# Patient Record
Sex: MALE | Race: BLACK OR AFRICAN AMERICAN | NOT HISPANIC OR LATINO | Employment: FULL TIME | URBAN - METROPOLITAN AREA
[De-identification: names, ages, dates, MRNs, and addresses within clinical notes are randomized per-mention and may not be internally consistent; named-entity substitution may affect disease eponyms.]

---

## 2017-02-10 ENCOUNTER — HOSPITAL ENCOUNTER (OUTPATIENT)
Dept: RADIOLOGY | Facility: HOSPITAL | Age: 27
Discharge: HOME | End: 2017-02-10
Attending: FAMILY MEDICINE
Payer: COMMERCIAL

## 2017-02-10 ENCOUNTER — OFFICE VISIT (OUTPATIENT)
Dept: INTERNAL MEDICINE | Facility: CLINIC | Age: 27
End: 2017-02-10
Payer: COMMERCIAL

## 2017-02-10 VITALS
OXYGEN SATURATION: 98 % | BODY MASS INDEX: 39.17 KG/M2 | DIASTOLIC BLOOD PRESSURE: 90 MMHG | TEMPERATURE: 97 F | WEIGHT: 315 LBS | SYSTOLIC BLOOD PRESSURE: 140 MMHG | HEART RATE: 65 BPM | HEIGHT: 75 IN

## 2017-02-10 DIAGNOSIS — G89.29 CHRONIC PAIN OF RIGHT KNEE: ICD-10-CM

## 2017-02-10 DIAGNOSIS — M25.561 CHRONIC PAIN OF RIGHT KNEE: Primary | ICD-10-CM

## 2017-02-10 DIAGNOSIS — G89.29 CHRONIC PAIN OF RIGHT KNEE: Primary | ICD-10-CM

## 2017-02-10 DIAGNOSIS — M25.561 CHRONIC PAIN OF RIGHT KNEE: ICD-10-CM

## 2017-02-10 PROCEDURE — 99202 OFFICE O/P NEW SF 15 MIN: CPT | Mod: S$GLB,,, | Performed by: PHYSICIAN ASSISTANT

## 2017-02-10 PROCEDURE — 99999 PR PBB SHADOW E&M-EST. PATIENT-LVL IV: CPT | Mod: PBBFAC,,, | Performed by: PHYSICIAN ASSISTANT

## 2017-02-10 PROCEDURE — 73562 X-RAY EXAM OF KNEE 3: CPT | Mod: 26,RT,, | Performed by: RADIOLOGY

## 2017-02-10 PROCEDURE — 73560 X-RAY EXAM OF KNEE 1 OR 2: CPT | Mod: TC,59,LT

## 2017-02-10 PROCEDURE — 73560 X-RAY EXAM OF KNEE 1 OR 2: CPT | Mod: 26,59,LT, | Performed by: RADIOLOGY

## 2017-02-10 RX ORDER — MELOXICAM 15 MG/1
15 TABLET ORAL DAILY
Qty: 30 TABLET | Refills: 1 | Status: SHIPPED | OUTPATIENT
Start: 2017-02-10 | End: 2017-10-25 | Stop reason: SDUPTHER

## 2017-02-10 NOTE — PROGRESS NOTES
Subjective:       Patient ID: Lorne Hector is a 26 y.o. male.    Chief Complaint: Knee Pain (right)    Knee Pain    Incident onset: several months. There was no injury mechanism. The pain is present in the right knee. The quality of the pain is described as burning. The pain is moderate. The pain has been fluctuating since onset. Associated symptoms include a loss of motion. Pertinent negatives include no inability to bear weight, loss of sensation, muscle weakness, numbness or tingling. Associated symptoms comments: Has a history of a meniscus tear. No surgery has been done at this point. . The symptoms are aggravated by movement. He has tried NSAIDs for the symptoms. The treatment provided mild relief.     Review of Systems   Constitutional: Negative for chills, fatigue and fever.   Musculoskeletal: Positive for joint swelling. Negative for gait problem.   Neurological: Negative for tingling and numbness.       Objective:      Physical Exam   Constitutional: He appears well-developed and well-nourished. No distress.   Musculoskeletal:        Right knee: He exhibits abnormal meniscus. He exhibits normal range of motion, no swelling, no effusion, no erythema and normal patellar mobility. Tenderness found. Medial joint line and lateral joint line tenderness noted.   Skin: He is not diaphoretic.   Nursing note and vitals reviewed.      Assessment:       1. Chronic pain of right knee        Plan:       Chronic pain of right knee  -     Cancel: X-Ray Knee 3 View Right; Future; Expected date: 2/10/17  -     Ambulatory referral to Orthopedics  -     X-ray Knee Ortho Right; Future; Expected date: 2/10/17    Other orders  -     meloxicam (MOBIC) 15 MG tablet; Take 1 tablet (15 mg total) by mouth once daily.  Dispense: 30 tablet; Refill: 1

## 2017-02-10 NOTE — PATIENT INSTRUCTIONS
Continue with your medicine until gone. Rest and use ice as needed. Suggest to go to the ER if you become much worse.

## 2017-09-12 ENCOUNTER — OFFICE VISIT (OUTPATIENT)
Dept: INTERNAL MEDICINE | Facility: CLINIC | Age: 27
End: 2017-09-12
Payer: COMMERCIAL

## 2017-09-12 ENCOUNTER — LAB VISIT (OUTPATIENT)
Dept: LAB | Facility: HOSPITAL | Age: 27
End: 2017-09-12
Attending: INTERNAL MEDICINE
Payer: COMMERCIAL

## 2017-09-12 VITALS
SYSTOLIC BLOOD PRESSURE: 130 MMHG | BODY MASS INDEX: 39.17 KG/M2 | TEMPERATURE: 98 F | DIASTOLIC BLOOD PRESSURE: 86 MMHG | WEIGHT: 315 LBS | HEART RATE: 77 BPM | OXYGEN SATURATION: 97 % | HEIGHT: 75 IN

## 2017-09-12 DIAGNOSIS — E55.9 VITAMIN D DEFICIENCY: Primary | ICD-10-CM

## 2017-09-12 DIAGNOSIS — E55.9 VITAMIN D DEFICIENCY: ICD-10-CM

## 2017-09-12 LAB — 25(OH)D3+25(OH)D2 SERPL-MCNC: 45 NG/ML

## 2017-09-12 PROCEDURE — 99213 OFFICE O/P EST LOW 20 MIN: CPT | Mod: S$GLB,,, | Performed by: PHYSICIAN ASSISTANT

## 2017-09-12 PROCEDURE — 3008F BODY MASS INDEX DOCD: CPT | Mod: S$GLB,,, | Performed by: PHYSICIAN ASSISTANT

## 2017-09-12 PROCEDURE — 99999 PR PBB SHADOW E&M-EST. PATIENT-LVL III: CPT | Mod: PBBFAC,,, | Performed by: PHYSICIAN ASSISTANT

## 2017-09-12 PROCEDURE — 82306 VITAMIN D 25 HYDROXY: CPT

## 2017-09-12 PROCEDURE — 36415 COLL VENOUS BLD VENIPUNCTURE: CPT

## 2017-09-12 RX ORDER — PREDNISONE 10 MG/1
TABLET ORAL
Qty: 18 TABLET | Refills: 0 | Status: SHIPPED | OUTPATIENT
Start: 2017-09-12 | End: 2017-10-25

## 2017-09-12 RX ORDER — DESLORATADINE 5 MG/1
5 TABLET ORAL DAILY
Qty: 30 TABLET | Refills: 11 | Status: SHIPPED | OUTPATIENT
Start: 2017-09-12 | End: 2017-10-25

## 2017-09-13 NOTE — PROGRESS NOTES
Subjective:       Patient ID: Lorne Hector is a 27 y.o. male.    Chief Complaint: Allergies    URI    This is a new problem. The current episode started 1 to 4 weeks ago. The problem has been unchanged. There has been no fever. Associated symptoms include congestion, a plugged ear sensation, rhinorrhea and sneezing. Pertinent negatives include no abdominal pain, chest pain, sinus pain, sore throat or wheezing. He has tried nothing for the symptoms.     Review of Systems   Constitutional: Negative for chills and fatigue.   HENT: Positive for congestion, postnasal drip, rhinorrhea and sneezing. Negative for sinus pain and sore throat.    Respiratory: Negative for shortness of breath and wheezing.    Cardiovascular: Negative for chest pain.   Gastrointestinal: Negative for abdominal pain.       Objective:      Physical Exam   Constitutional: He appears well-developed and well-nourished. No distress.   HENT:   Head: Normocephalic and atraumatic.   Right Ear: A middle ear effusion is present.   Left Ear: A middle ear effusion is present.   Nose: Mucosal edema and rhinorrhea present.   Mouth/Throat: Uvula is midline, oropharynx is clear and moist and mucous membranes are normal. No tonsillar exudate.   Skin: He is not diaphoretic.   Nursing note and vitals reviewed.      Assessment:       1. Vitamin D deficiency        Plan:       Vitamin D deficiency  -     Vitamin D; Future; Expected date: 09/12/2017    Other orders  -     predniSONE (DELTASONE) 10 MG tablet; Take 3 daily for 3 days, then 2 daily for three days, then 1 daily for three days.  Dispense: 18 tablet; Refill: 0  -     desloratadine (CLARINEX) 5 mg tablet; Take 1 tablet (5 mg total) by mouth once daily.  Dispense: 30 tablet; Refill: 11

## 2017-10-25 ENCOUNTER — OFFICE VISIT (OUTPATIENT)
Dept: INTERNAL MEDICINE | Facility: CLINIC | Age: 27
End: 2017-10-25
Payer: COMMERCIAL

## 2017-10-25 VITALS
BODY MASS INDEX: 39.17 KG/M2 | HEIGHT: 75 IN | TEMPERATURE: 97 F | HEART RATE: 89 BPM | SYSTOLIC BLOOD PRESSURE: 120 MMHG | WEIGHT: 315 LBS | DIASTOLIC BLOOD PRESSURE: 90 MMHG | OXYGEN SATURATION: 96 %

## 2017-10-25 DIAGNOSIS — M25.561 RIGHT KNEE PAIN, UNSPECIFIED CHRONICITY: Primary | ICD-10-CM

## 2017-10-25 PROCEDURE — 99213 OFFICE O/P EST LOW 20 MIN: CPT | Mod: S$GLB,,, | Performed by: FAMILY MEDICINE

## 2017-10-25 PROCEDURE — 99999 PR PBB SHADOW E&M-EST. PATIENT-LVL IV: CPT | Mod: PBBFAC,,, | Performed by: FAMILY MEDICINE

## 2017-10-25 RX ORDER — MELOXICAM 15 MG/1
15 TABLET ORAL DAILY
Qty: 30 TABLET | Refills: 1 | Status: SHIPPED | OUTPATIENT
Start: 2017-10-25 | End: 2018-03-13 | Stop reason: ALTCHOICE

## 2017-10-25 NOTE — PROGRESS NOTES
Lorne Nabil Krunal  10/25/2017  [id number]    Primary Doctor No  Patient Care Team:  Primary Doctor No as PCP - General  Has the patient seen any provider outside of the [facility] network since the last visit? (no). If yes, HIPPA forms completed and records requested.        Visit Type:an urgent visit for an existing problem     Chief Complaint:  Chief Complaint   Patient presents with    Knee Pain     Right,Tight,Started Yesterday,Took Ibuprofen       History of Present Illness:  27 year old here for knee pain.  He has seen PA for this in Feb.  He was given Mobic and referred to Ortho.  I do not see that referral was completed in chart.    He had xray of knee.2.10.17    Findings: There is mild joint space narrowing involving the medial compartment of the right knee.  There is also mild degenerative change involving the patellofemoral compartment.  No joint effusion.  No acute fracture or dislocation.    He has tried Ibuprofen for his pain and swelling. Mobic helped a little as well.    He was a football player in highschool. Gives history of meniscal injury without repair.      He had history of Vit D def. The last provider ordered Vit D and it was normal range.      History:  History reviewed. No pertinent past medical history.  History reviewed. No pertinent surgical history.  Family History   Problem Relation Age of Onset    Asthma Mother     Heart disease Mother     Hypertension Mother     Hypertension Father     Asthma Sister     Diabetes Son     Diabetes Paternal Aunt      Social History     Social History    Marital status: Single     Spouse name: N/A    Number of children: N/A    Years of education: N/A     Occupational History     [employer]     Social History Main Topics    Smoking status: Never Smoker    Smokeless tobacco: Never Used    Alcohol use Yes      Comment: Occasionally    Drug use: No    Sexual activity: No     Other Topics Concern    Not on file     Social History Narrative     No narrative on file     There is no problem list on file for this patient.    Review of patient's allergies indicates:  No Known Allergies    The following were reviewed at this visit: active problem list, medication list, allergies, family history, social history, and health maintenance.    Medications:  Current Outpatient Prescriptions on File Prior to Visit   Medication Sig Dispense Refill    [DISCONTINUED] desloratadine (CLARINEX) 5 mg tablet Take 1 tablet (5 mg total) by mouth once daily. 30 tablet 11    MULTIVITAMIN (DAILY VITAMINS ORAL) Take by mouth.      [DISCONTINUED] meloxicam (MOBIC) 15 MG tablet Take 1 tablet (15 mg total) by mouth once daily. 30 tablet 1    [DISCONTINUED] predniSONE (DELTASONE) 10 MG tablet Take 3 daily for 3 days, then 2 daily for three days, then 1 daily for three days. 18 tablet 0     No current facility-administered medications on file prior to visit.        Medications have been reviewed and reconciled with patient at this visit.  Barriers to medications present (no)    Adverse reactions to current medications (no)    Over the counter medications reviewed (Yes ), and if needed added to active Medication list at this visit.     Exam:  Wt Readings from Last 3 Encounters:   10/25/17 (!) 177.3 kg (390 lb 14 oz)   09/12/17 (!) 174.6 kg (384 lb 14.8 oz)   02/10/17 (!) 167.2 kg (368 lb 9.8 oz)     Temp Readings from Last 3 Encounters:   10/25/17 97.4 °F (36.3 °C) (Tympanic)   09/12/17 97.9 °F (36.6 °C) (Tympanic)   02/10/17 96.8 °F (36 °C) (Tympanic)     BP Readings from Last 3 Encounters:   10/25/17 (!) 120/90   09/12/17 130/86   02/10/17 (!) 140/90     Pulse Readings from Last 3 Encounters:   10/25/17 89   09/12/17 77   02/10/17 65     Body mass index is 48.86 kg/m².      Review of Systems   Constitutional: Negative.  Negative for chills and fever.   HENT: Negative.  Negative for congestion, sinus pain and sore throat.    Eyes: Negative for blurred vision and double vision.    Respiratory: Negative for cough, sputum production, shortness of breath and wheezing.    Cardiovascular: Negative for chest pain, palpitations and leg swelling.   Gastrointestinal: Negative for abdominal pain, constipation, diarrhea, heartburn, nausea and vomiting.   Genitourinary: Negative.    Musculoskeletal: Positive for joint pain.   Skin: Negative.  Negative for rash.   Neurological: Negative.    Endo/Heme/Allergies: Negative.  Negative for polydipsia. Does not bruise/bleed easily.   Psychiatric/Behavioral: Negative for depression and substance abuse.       Physical Exam   Musculoskeletal: Normal range of motion. He exhibits tenderness. He exhibits no edema.        Right knee: Tenderness found.       Laboratory Reviewed ({Yes)  Lab Results   Component Value Date    WBC 6.35 01/27/2012    HGB 15.1 01/27/2012    HCT 44.1 01/27/2012     01/27/2012    CHOL 166 01/27/2012    TRIG 98 01/27/2012    HDL 34 (L) 01/27/2012    ALT 40 01/27/2012    AST 40 01/27/2012     01/27/2012    K 4.3 01/27/2012     01/27/2012    CREATININE 1.0 01/27/2012    BUN 12 01/27/2012    CO2 26 01/27/2012    TSH 1.979 01/27/2012       Assessment:  The encounter diagnosis was Right knee pain, unspecified chronicity.     Plan     Right knee pain, unspecified chronicity  -     Ambulatory referral to Orthopedics  -     meloxicam (MOBIC) 15 MG tablet; Take 1 tablet (15 mg total) by mouth once daily.  Dispense: 30 tablet; Refill: 1    Refer again to Ortho.  He did not show up for appt he had in Feb 2017 with Ortho    Discussed overuse of NSAID.  May consider PT  Will discuss with Ortho    He declines the flu vaccine today    Monitor BP, mild elevated. Lifestyle changes  Weight loss and diet discussed  Recheck one year    Care Plan/Goals: Reviewed  (N/A)  Goals     None          Follow up: No Follow-up on file.    After visit summary was printed and given to patient upon discharge today.  Patient goals and care plan are included  in After Visit Summary.

## 2017-10-26 ENCOUNTER — OFFICE VISIT (OUTPATIENT)
Dept: ORTHOPEDICS | Facility: CLINIC | Age: 27
End: 2017-10-26
Payer: COMMERCIAL

## 2017-10-26 VITALS
SYSTOLIC BLOOD PRESSURE: 166 MMHG | DIASTOLIC BLOOD PRESSURE: 94 MMHG | WEIGHT: 315 LBS | HEIGHT: 75 IN | RESPIRATION RATE: 12 BRPM | BODY MASS INDEX: 39.17 KG/M2 | HEART RATE: 95 BPM

## 2017-10-26 DIAGNOSIS — M25.561 ACUTE PAIN OF RIGHT KNEE: ICD-10-CM

## 2017-10-26 DIAGNOSIS — S83.421A SPRAIN OF LATERAL COLLATERAL LIGAMENT OF RIGHT KNEE, INITIAL ENCOUNTER: Primary | ICD-10-CM

## 2017-10-26 PROCEDURE — 99213 OFFICE O/P EST LOW 20 MIN: CPT | Mod: S$GLB,,, | Performed by: PHYSICIAN ASSISTANT

## 2017-10-26 PROCEDURE — 99999 PR PBB SHADOW E&M-EST. PATIENT-LVL III: CPT | Mod: PBBFAC,,, | Performed by: PHYSICIAN ASSISTANT

## 2017-10-26 NOTE — LETTER
October 27, 2017      Mirna Reilly MD  [address]  [place] [address]           [facility]  [address]  [place] [address]  Phone: [phone]  Fax: [phone]          Patient: Lorne Hector   MR Number: [id number]   YOB: 1990   Date of Visit: 10/26/2017       Dear Dr. Mirna Reilly:    Thank you for referring Lorne Hector to me for evaluation. Attached you will find relevant portions of my assessment and plan of care.    If you have questions, please do not hesitate to call me. I look forward to following Lorne Hector along with you.    Sincerely,    Autumn Colmenares PA-C    Enclosure  CC:  No Recipients    If you would like to receive this communication electronically, please contact [email or web address] or [phone] to request more information on [employer] access.    For providers and/or their staff who would like to refer a patient to [facility], please contact us through our one-stop-shop provider referral line, [facility] , at [phone].    If you feel you have received this communication in error or would no longer like to receive these types of communications, please e-mail [email or web address]

## 2017-10-26 NOTE — PROGRESS NOTES
Subjective:      Patient ID: Lorne Hector is a 27 y.o. male.    Chief Complaint: Pain of the Right Knee      HPI: Lorne Hector  is a 27 y.o. male who c/o Pain of the Right Knee   for duration of about 3 days.  He was moving some cabinets out of a truck at work when he felt a pop in the knee and had onset of pain.  He complains of associated swelling following that, however, the swelling has improved.  He now complains of a tightness as well as a sharp intermittent pain in the right knee posterior laterally.  He also has some aching and throbbing pain as well.  Alleviating factors include rest.  Aggravating factors include pivoting, deep flexion, and stairs.  He tried and knee brace that he had in his possession which did not help his pain.  Pain level at worst is 0 out of 10.  However, it increases with the above activities.    Review of Systems   Constitution: Negative for fever.   Cardiovascular: Negative for chest pain.   Respiratory: Negative for cough and shortness of breath.    Skin: Negative for rash.   Musculoskeletal: Positive for joint pain, joint swelling and stiffness.   Gastrointestinal: Negative for heartburn.   Neurological: Negative for headaches and numbness.         Objective:        General    Nursing note and vitals reviewed.  Constitutional: He is oriented to person, place, and time. He appears well-developed and well-nourished.   HENT:   Head: Normocephalic and atraumatic.   Eyes: EOM are normal.   Cardiovascular: Normal rate and regular rhythm.    Pulmonary/Chest: Effort normal.   Abdominal: Soft.   Neurological: He is alert and oriented to person, place, and time.   Psychiatric: He has a normal mood and affect. His behavior is normal.     General Musculoskeletal Exam   Gait: normal       Right Knee Exam     Inspection   Erythema: absent  Swelling: absent  Effusion: effusion  Deformity: deformity  Bruising: absent    Tenderness   The patient is tender to palpation of the  LCL.    Crepitus   The patient has crepitus of the patella.    Range of Motion   Extension: normal   Flexion: abnormal     Tests   Meniscus   Artem:  Medial - negative Lateral - negative  Ligament Examination Lachman: normal (-1 to 2mm) PCL-Posterior Drawer: normal (0 to 2mm)     MCL - Valgus: normal (0 to 2mm)  LCL - Varus: abnormal - grade I  Dial Test at 30 degrees: abnormal - grade IDial Test at 90 degrees: normal (< 5 degrees)  Patella   Patellar Apprehension: negative  Patellar Tracking: normal  Patellar Grind: negative    Other   Meniscal Cyst: absent  Popliteal (Baker's) Cyst: absent  Sensation: normal    Comments:  Comp soft, cap refill < 2 sec.    Left Knee Exam     Range of Motion   Extension: normal   Flexion: normal     Tests   Stability Lachman: normal (-1 to 2mm) PCL-Posterior Drawer: normal (0 to 2mm)  MCL - Valgus: normal (0 to 2mm)  LCL - Varus: normal (0 to 2mm)Dial Test at 30 degrees: normal (< 5 degrees)Dial Test at 90 degrees: normal (< 5 degrees)    Muscle Strength   Right Lower Extremity   Quadriceps:  5/5   Hamstrin/5     Vascular Exam       Edema  Right Lower Leg: absent            Xray:   Right knee from 2/10/17 images and report were reviewed today.  I agree with the radiologist's interpretation.  There is mild joint space narrowing involving the medial compartment of the right knee.  There is also mild degenerative change involving the patellofemoral compartment.  No joint effusion.  No acute fracture or dislocation.    Assessment:       Encounter Diagnoses   Name Primary?    Sprain of lateral collateral ligament of right knee, initial encounter Yes    Acute pain of right knee           Plan:       Lorne was seen today for pain.    Diagnoses and all orders for this visit:    Sprain of lateral collateral ligament of right knee, initial encounter  -     MRI Lower Extremity Joint WO Cont Right; Future    Acute pain of right knee    Lorne comes in today for new injury of the  right knee.  I do have a little concerned that he sprained his lateral collateral ligament.  I have ordered an MRI to evaluate his posterior lateral corner as well as his lateral meniscus.  I will call him with those results once they become available.  Patient verbalizes understanding and agrees with the above plan.        The patient understands, chooses and consents to this plan and accepts all   the risks which include but are not limited to the risks mentioned above.     Disclaimer: This note was prepared using a voice recognition system and is likely to have sound alike errors within the text.

## 2017-10-30 ENCOUNTER — HOSPITAL ENCOUNTER (OUTPATIENT)
Dept: RADIOLOGY | Facility: HOSPITAL | Age: 27
Discharge: HOME | End: 2017-10-30
Attending: PHYSICIAN ASSISTANT
Payer: COMMERCIAL

## 2017-10-30 ENCOUNTER — TELEPHONE (OUTPATIENT)
Dept: ORTHOPEDICS | Facility: CLINIC | Age: 27
End: 2017-10-30

## 2017-10-30 DIAGNOSIS — S83.421A SPRAIN OF LATERAL COLLATERAL LIGAMENT OF RIGHT KNEE, INITIAL ENCOUNTER: ICD-10-CM

## 2017-10-30 PROCEDURE — 73721 MRI JNT OF LWR EXTRE W/O DYE: CPT | Mod: TC,PO,RT

## 2017-10-30 PROCEDURE — 73721 MRI JNT OF LWR EXTRE W/O DYE: CPT | Mod: 26,RT,, | Performed by: RADIOLOGY

## 2017-10-30 NOTE — TELEPHONE ENCOUNTER
----- Message from Corbin Kaufman sent at 10/30/2017  9:47 AM CDT -----  Contact: [phone]  Would like to consult with nurse about test results. Please call bk at [phone]. thx lj

## 2017-10-30 NOTE — TELEPHONE ENCOUNTER
Returned call to patient and informed him that MARK ANTHONY Colmenares would give him a call once she receives his results. Patient verbalized understanding.

## 2017-10-30 NOTE — TELEPHONE ENCOUNTER
----- Message from Edwige Escudero sent at 10/30/2017 12:04 PM CDT -----  Pt is requesting a call from nurse to schedule for his results.            Please call pt back at [phone]

## 2017-11-01 ENCOUNTER — TELEPHONE (OUTPATIENT)
Dept: ORTHOPEDICS | Facility: CLINIC | Age: 27
End: 2017-11-01

## 2017-11-01 NOTE — TELEPHONE ENCOUNTER
Spoke with patient and informed him that he needs to see Dr Hodges. Patient states that this is Work Comp. Patient was given the information to have his adjustor contact our work comp department with his claim information and we will schedule his appointment once this is completed. Patient verbalized understanding.

## 2017-11-01 NOTE — TELEPHONE ENCOUNTER
Phoned patient with MRI results per MARK ANTHONY Colmenares and left a message for him to call back

## 2017-11-01 NOTE — TELEPHONE ENCOUNTER
----- Message from Cecile Keita sent at 11/1/2017 12:04 PM CDT -----  Contact: pt   Pt returning your phone call..[phone] ([phone])

## 2017-11-08 ENCOUNTER — PATIENT MESSAGE (OUTPATIENT)
Dept: ORTHOPEDICS | Facility: CLINIC | Age: 27
End: 2017-11-08

## 2017-11-10 ENCOUNTER — OFFICE VISIT (OUTPATIENT)
Dept: ORTHOPEDICS | Facility: CLINIC | Age: 27
End: 2017-11-10
Payer: OTHER MISCELLANEOUS

## 2017-11-10 VITALS
BODY MASS INDEX: 39.17 KG/M2 | RESPIRATION RATE: 18 BRPM | SYSTOLIC BLOOD PRESSURE: 150 MMHG | HEART RATE: 76 BPM | HEIGHT: 75 IN | DIASTOLIC BLOOD PRESSURE: 87 MMHG | WEIGHT: 315 LBS

## 2017-11-10 DIAGNOSIS — M76.31 ILIOTIBIAL BAND SYNDROME OF RIGHT SIDE: ICD-10-CM

## 2017-11-10 DIAGNOSIS — M23.006 CYST OF MENISCUS OF RIGHT KNEE: Primary | ICD-10-CM

## 2017-11-10 DIAGNOSIS — S83.241S ACUTE MEDIAL MENISCUS TEAR OF RIGHT KNEE, SEQUELA: Primary | ICD-10-CM

## 2017-11-10 DIAGNOSIS — S83.241A ACUTE MEDIAL MENISCUS TEAR OF RIGHT KNEE, INITIAL ENCOUNTER: ICD-10-CM

## 2017-11-10 PROCEDURE — 99999 PR PBB SHADOW E&M-EST. PATIENT-LVL III: CPT | Mod: PBBFAC,,, | Performed by: ORTHOPAEDIC SURGERY

## 2017-11-10 PROCEDURE — 99214 OFFICE O/P EST MOD 30 MIN: CPT | Mod: S$GLB,,, | Performed by: ORTHOPAEDIC SURGERY

## 2017-11-10 RX ORDER — DICLOFENAC SODIUM 10 MG/G
2 GEL TOPICAL 4 TIMES DAILY
Qty: 1 TUBE | Refills: 2 | Status: SHIPPED | OUTPATIENT
Start: 2017-11-10 | End: 2018-03-13 | Stop reason: SDUPTHER

## 2017-11-10 NOTE — PROGRESS NOTES
Subjective:     Patient ID: Lorne Hector is a 27 y.o. male.    Chief Complaint: Pain of the Right Knee    DOI 10/24/17  Works at [employer]  Moving cabinets around twisted the knee, felt a pop and had some pain after. Pain has improved with nsaids, no locking catching now.      Knee Injury    The pain is present in the right knee. This is a recurrent problem. The current episode started 1 to 4 weeks ago. There has been no history of extremity trauma. The injury was the result of a throwing action while at work. The problem occurs intermittently. The problem has been gradually improving. The quality of the pain is described as throbbing. The pain is at a severity of 3/10. Associated symptoms include joint swelling and numbness. Pertinent negatives include no fever. The symptoms are aggravated by bearing weight. He has tried cold for the symptoms. The treatment provided no relief.       History reviewed. No pertinent past medical history.  Past Surgical History:   Procedure Laterality Date    WISDOM TOOTH EXTRACTION       Family History   Problem Relation Age of Onset    Asthma Mother     Heart disease Mother     Hypertension Mother     Hypertension Father     Asthma Sister     Diabetes Son     Diabetes Paternal Aunt      Social History     Social History    Marital status: Single     Spouse name: N/A    Number of children: N/A    Years of education: N/A     Occupational History     [employer]     Social History Main Topics    Smoking status: Never Smoker    Smokeless tobacco: Never Used    Alcohol use Yes      Comment: Occasionally    Drug use: No    Sexual activity: No     Other Topics Concern    Not on file     Social History Narrative    No narrative on file     Medication List with Changes/Refills   Current Medications    MELOXICAM (MOBIC) 15 MG TABLET    Take 1 tablet (15 mg total) by mouth once daily.    MULTIVITAMIN (DAILY VITAMINS ORAL)    Take by mouth.     Review of  patient's allergies indicates:  No Known Allergies  Review of Systems   Constitution: Negative for fever and night sweats.   HENT: Negative for hearing loss.    Eyes: Negative for blurred vision and visual disturbance.   Cardiovascular: Positive for leg swelling. Negative for chest pain.   Respiratory: Negative for shortness of breath.    Endocrine: Negative for polyuria.   Hematologic/Lymphatic: Negative for bleeding problem.   Skin: Negative for rash.   Musculoskeletal: Positive for joint pain. Negative for back pain, joint swelling, muscle cramps and muscle weakness.   Gastrointestinal: Negative for melena.   Genitourinary: Negative for hematuria.   Neurological: Positive for loss of balance and numbness. Negative for paresthesias.   Psychiatric/Behavioral: Negative for altered mental status.       Objective:   Body mass index is 48.83 kg/m².  Vitals:    11/10/17 0840   BP: (!) 150/87   Pulse: 76   Resp: 18       General: Lorne is well-developed, well-nourished, appears stated age, in no acute distress, alert and oriented to time, place and person.       General    Vitals reviewed.  Constitutional: He is oriented to person, place, and time. He appears well-developed and well-nourished. No distress.   HENT:   Mouth/Throat: No oropharyngeal exudate.   Eyes: Right eye exhibits no discharge. Left eye exhibits no discharge.   Neck: Normal range of motion.   Pulmonary/Chest: Effort normal and breath sounds normal. No respiratory distress.   Neurological: He is alert and oriented to person, place, and time. He has normal reflexes. No cranial nerve deficit. Coordination normal.   Psychiatric: He has a normal mood and affect. His behavior is normal. Judgment and thought content normal.     General Musculoskeletal Exam   Gait: normal       Right Knee Exam     Inspection   Erythema: absent  Scars: absent  Swelling: absent  Effusion: effusion  Deformity: deformity  Bruising: absent    Tenderness   The patient is tender to  palpation of the iliotibial band.    Range of Motion   Extension: 0   Flexion: 140     Tests   Meniscus   Artem:  Medial - negative Lateral - negative  Ligament Examination Lachman: normal (-1 to 2mm) PCL-Posterior Drawer: normal (0 to 2mm)     MCL - Valgus: normal (0 to 2mm)  LCL - Varus: normalPivot Shift: normal (Equal)Reverse Pivot Shift: normal (Equal)Dial Test at 30 degrees: normal (< 5 degrees)Dial Test at 90 degrees: normal (< 5 degrees)  Posterior Sag Test: negative  Posterolateral Corner: unstable (>15 degrees difference)  Patella   Patellar Apprehension: negative  Passive Patellar Tilt: neutral  Patellar Tracking: normal  Patellar Glide (quadrants): Lateral - 1   Medial - 2  Q-Angle at 90 degrees: normal  Patellar Grind: negative  J-Sign: none    Other   Meniscal Cyst: absent  Popliteal (Baker's) Cyst: absent  Sensation: normal    Left Knee Exam     Inspection   Erythema: absent  Scars: absent  Swelling: absent  Effusion: absent  Deformity: deformity  Bruising: absent    Tenderness   The patient is experiencing no tenderness.         Range of Motion   Extension: 0   Flexion: 140     Tests   Meniscus   Artem:  Medial - negative Lateral - negative  Stability Lachman: normal (-1 to 2mm) PCL-Posterior Drawer: normal (0 to 2mm)  MCL - Valgus: normal (0 to 2mm)  LCL - Varus: normal (0 to 2mm)Pivot Shift: normal (Equal)Reverse Pivot Shift: normal (Equal)Dial Test at 30 degrees: normal (< 5 degrees)Dial Test at 90 degrees: normal (< 5 degrees)  Posterior Sag Test: negative  Posterolateral Corner: unstable (>15 degrees difference)  Patella   Patellar Apprehension: negative  Passive Patellar Tilt: neutral  Patellar Tracking: normal  Patellar Glide (Quadrants): Lateral - 1 Medial - 2  Q-Angle at 90 degrees: normal  Patellar Grind: negative  J-Sign: J sign absent    Other   Meniscal Cyst: absent  Popliteal (Baker's) Cyst: absent  Sensation: normal    Right Hip Exam     Tests   Juan F: positive  Left Hip Exam      Tests   Juan F: negative          Muscle Strength   Right Lower Extremity   Hip Abduction: 5/5   Quadriceps:  4/5   Hamstrin/5   Left Lower Extremity   Hip Abduction: 5/5   Quadriceps:  5/5   Hamstrin/5     Reflexes     Left Side  Quadriceps:  2+  Achilles:  2+    Right Side   Quadriceps:  2+  Achilles:  2+    Vascular Exam     Right Pulses  Dorsalis Pedis:      2+  Posterior Tibial:      2+        Left Pulses  Dorsalis Pedis:      2+  Posterior Tibial:      2+            Assessment:     Encounter Diagnoses   Name Primary?    Acute medial meniscus tear of right knee, initial encounter     Cyst of meniscus of right knee Yes    Iliotibial band syndrome of right side         Plan:     1. Discussed diagnosis with patient. He is not having mechanical symptoms at this time and no joint line tenderness over the meniscus. Discussed surgical vs nonsurgical management at this time, he would like to proceed with nonsurgical management. Discussed that the meniscal cyst may increase in size, he expressed understanding.    2. PT for IT band stretching/rolling/dry needling    3. Voltaren gel    4. F/up PRN    5. Continue work, no restrictions

## 2017-11-10 NOTE — LETTER
November 10, 2017      Autumn Colmenares PA-C  [address] [facility] [address] [place] [address]           [facility]  [address] [facility] [address] [place] [address]  Phone: [phone]  Fax: [phone]          Patient: Lorne Hector   MR Number: [id number]   YOB: 1990   Date of Visit: 11/10/2017       Dear Autumn Colmenares:    Thank you for referring Lorne Hector to me for evaluation. Attached you will find relevant portions of my assessment and plan of care.    If you have questions, please do not hesitate to call me. I look forward to following Lorne Hector along with you.    Sincerely,    Siddhartha Hodges MD    Enclosure  CC:  No Recipients    If you would like to receive this communication electronically, please contact [email or web address] or [phone] to request more information on [employer] Link access.    For providers and/or their staff who would like to refer a patient to [facility], please contact us through our one-stop-shop provider referral line, [facility] , at [phone].    If you feel you have received this communication in error or would no longer like to receive these types of communications, please e-mail [email or web address]

## 2017-11-14 ENCOUNTER — TELEPHONE (OUTPATIENT)
Dept: ORTHOPEDICS | Facility: CLINIC | Age: 27
End: 2017-11-14

## 2017-11-14 NOTE — TELEPHONE ENCOUNTER
----- Message from Allison Caballero sent at 11/14/2017  8:34 AM CST -----  .  A current work status summary is needed on this patient. Please fax to [phone]      Thanks

## 2018-02-23 ENCOUNTER — OFFICE VISIT (OUTPATIENT)
Dept: INTERNAL MEDICINE | Facility: CLINIC | Age: 28
End: 2018-02-23
Payer: COMMERCIAL

## 2018-02-23 ENCOUNTER — HOSPITAL ENCOUNTER (OUTPATIENT)
Dept: RADIOLOGY | Facility: HOSPITAL | Age: 28
Discharge: HOME | End: 2018-02-23
Attending: NURSE PRACTITIONER
Payer: COMMERCIAL

## 2018-02-23 VITALS
TEMPERATURE: 99 F | SYSTOLIC BLOOD PRESSURE: 136 MMHG | WEIGHT: 315 LBS | HEIGHT: 75 IN | BODY MASS INDEX: 39.17 KG/M2 | DIASTOLIC BLOOD PRESSURE: 84 MMHG | HEART RATE: 78 BPM | OXYGEN SATURATION: 97 %

## 2018-02-23 DIAGNOSIS — M25.511 ACUTE PAIN OF RIGHT SHOULDER: ICD-10-CM

## 2018-02-23 DIAGNOSIS — M25.511 ACUTE PAIN OF RIGHT SHOULDER: Primary | ICD-10-CM

## 2018-02-23 PROCEDURE — 99214 OFFICE O/P EST MOD 30 MIN: CPT | Mod: S$GLB,,, | Performed by: NURSE PRACTITIONER

## 2018-02-23 PROCEDURE — 99999 PR PBB SHADOW E&M-EST. PATIENT-LVL III: CPT | Mod: PBBFAC,,, | Performed by: NURSE PRACTITIONER

## 2018-02-23 PROCEDURE — 3008F BODY MASS INDEX DOCD: CPT | Mod: S$GLB,,, | Performed by: NURSE PRACTITIONER

## 2018-02-23 PROCEDURE — 73030 X-RAY EXAM OF SHOULDER: CPT | Mod: 26,RT,, | Performed by: RADIOLOGY

## 2018-02-23 PROCEDURE — 73030 X-RAY EXAM OF SHOULDER: CPT | Mod: TC,RT

## 2018-02-23 RX ORDER — CYCLOBENZAPRINE HCL 5 MG
5 TABLET ORAL NIGHTLY PRN
Qty: 15 TABLET | Refills: 0 | Status: SHIPPED | OUTPATIENT
Start: 2018-02-23 | End: 2018-12-10 | Stop reason: SDUPTHER

## 2018-02-23 NOTE — PATIENT INSTRUCTIONS
MEDICATION: CYCLOBENZAPRINE    Cyclobenzaprine (brand: Flexeril) is a sedative and muscle relaxant medication. It is intended to be used along with rest, heat or ice packs, and other measures to relieve acute muscle spasm.  DIRECTIONS FOR USE:  Cyclobenzaprine  may be taken on an empty stomach or with food. Take the medicine at regular intervals. If the label says every 8 hours, this means 3 times per day. Doses don't have to be exactly 8 hours apart, but you should take 3 doses a day. This medicine should not be taken daily for more than 3 weeks without consulting your doctor.   WHAT TO WATCH FOR:  POSSIBLE SIDE EFFECTS: Drowsiness, dizziness (Take it less often and contact your doctor if symptoms persist). Dry mouth (Chew gum or suck on hard candy). Constipation (Contact your doctor). Difficulty passing urine; seizure (Stop the medicine and contact your doctor).  MEDICAL CONDITIONS: Before starting this medicine, be sure your doctor knows if you have any of the following conditions:  · Pregnancy or breastfeeding, sleep apnea  · Glaucoma, prostate enlargement, seizure disorder, asthma, or lung disease  DRUG INTERACTIONS: Before starting this medicine, be sure your doctor knows if you are taking any of the following drugs:  · MAO inhibitors within the last 14 days [Nardil (phenelzine), Parnate (tranylcypromine)];  · Tricyclic antidepressants [amitriptyline (Elavil), doxepin (Sinequan), imipramine (Tofranil), and others],  · Phenothiazines: Thorazine, Haldol, Mellaril, Compazine, and others  · Antihistamines Benadryl, Vistaril, Atarax and others; Bentyl, Donnatal, Librax, Pro-Banthine,  · Cystospaz, Cogentin, Artane, Combivent, or Atrovent inhalers; anti-Parkinson drugs  WARNINGS:  · Do not drive, ride a bicycle, or operate dangerous equipment while taking this medicine until you know how it will affect you.  · May cause excess drowsiness when taken with alcohol, muscle relaxant, sedative, or pain medicine. Use  with caution.  · May cause thickened mucus and worsening of asthma, COPD, or emphysema. Use with caution.  [NOTE: This information topic may not include all directions, precautions, medical conditions, drug/food interactions and warnings for this drug. Check with your doctor, nurse, or pharmacist for any questions that you may have.]  © 7544-1394 [facility], [address] [place] [address], [place] [address]. All rights reserved. This information is not intended as a substitute for professional medical care. Always follow your healthcare professional's instructions.

## 2018-02-23 NOTE — PROGRESS NOTES
Subjective:       Patient ID: Lorne Hector is a 27 y.o. male.    Chief Complaint: R shoulder pain    Patient presents with right shoulder pain, 4/10.      Shoulder Pain    The pain is present in the right shoulder. This is a new problem. The current episode started 1 to 4 weeks ago. There has been no history of extremity trauma. The problem occurs daily. The problem has been gradually worsening. The quality of the pain is described as burning and aching. The pain is at a severity of 4/10. Pertinent negatives include no fever, headaches, inability to bear weight, joint locking, joint swelling, limited range of motion, numbness, stiffness or tingling. Associated symptoms comments: Hears a click sometimes. The symptoms are aggravated by activity (lifting arm over head). He has tried NSAIDS for the symptoms. The treatment provided moderate relief.     Review of Systems   Constitutional: Negative for fever.   Musculoskeletal: Positive for arthralgias (right shoulder). Negative for back pain, joint swelling, neck pain, neck stiffness and stiffness.   Skin: Negative for rash and wound.   Neurological: Negative for tingling, weakness, numbness and headaches.       Objective:      Physical Exam   Constitutional: He appears well-developed and well-nourished. No distress.   Musculoskeletal:        Right shoulder: He exhibits decreased range of motion. He exhibits no tenderness, no bony tenderness, no swelling, no effusion, no crepitus, no deformity, no laceration, no pain, no spasm, normal pulse and normal strength.   Skin: He is not diaphoretic.   Vitals reviewed.      Assessment:       1. Acute pain of right shoulder        Plan:   Acute pain of right shoulder  -     X-ray Shoulder 2 or More Views Right; Future; Expected date: 02/23/2018  -     cyclobenzaprine (FLEXERIL) 5 MG tablet; Take 1 tablet (5 mg total) by mouth nightly as needed for Muscle spasms.  Dispense: 15 tablet; Refill: 0      X-ray does not indicate  an acute process. NSAID and flexeril and if no improvement in 2 weeks, can consider PT referral.  Follow-up if symptoms worsen or fail to improve.

## 2018-03-13 ENCOUNTER — OFFICE VISIT (OUTPATIENT)
Dept: INTERNAL MEDICINE | Facility: CLINIC | Age: 28
End: 2018-03-13
Payer: COMMERCIAL

## 2018-03-13 VITALS
TEMPERATURE: 98 F | SYSTOLIC BLOOD PRESSURE: 150 MMHG | BODY MASS INDEX: 39.17 KG/M2 | HEIGHT: 75 IN | WEIGHT: 315 LBS | DIASTOLIC BLOOD PRESSURE: 90 MMHG | HEART RATE: 96 BPM | OXYGEN SATURATION: 97 %

## 2018-03-13 DIAGNOSIS — M25.511 ACUTE PAIN OF RIGHT SHOULDER: Primary | ICD-10-CM

## 2018-03-13 DIAGNOSIS — E66.01 MORBID OBESITY WITH BMI OF 45.0-49.9, ADULT: ICD-10-CM

## 2018-03-13 PROCEDURE — 99213 OFFICE O/P EST LOW 20 MIN: CPT | Mod: S$GLB,,, | Performed by: NURSE PRACTITIONER

## 2018-03-13 PROCEDURE — 99999 PR PBB SHADOW E&M-EST. PATIENT-LVL III: CPT | Mod: PBBFAC,,, | Performed by: NURSE PRACTITIONER

## 2018-03-13 RX ORDER — DICLOFENAC SODIUM 10 MG/G
2 GEL TOPICAL 4 TIMES DAILY
Qty: 1 TUBE | Refills: 2 | Status: SHIPPED | OUTPATIENT
Start: 2018-03-13 | End: 2018-12-10

## 2018-03-13 NOTE — PROGRESS NOTES
Subjective:       Patient ID: Lorne Hector is a 27 y.o. male.    Chief Complaint: Shoulder Pain (been in pain for about 2 wks)    Patient presents for right shoulder pain. He was seen and treated by me on 2/23 for same pain. He used mobic and flexeril which provided moderate relief. 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 It now hurts with movement.       Shoulder Pain    Pertinent negatives include no fever or numbness.     Review of Systems   Constitutional: Negative for chills and fever.   Musculoskeletal: Positive for arthralgias (right shoulder). Negative for joint swelling.   Neurological: Negative for weakness and numbness.       Objective:      Physical Exam   Constitutional: He appears well-developed and well-nourished. No distress.   Musculoskeletal:        Right shoulder: He exhibits decreased range of motion.   Skin: He is not diaphoretic.   Vitals reviewed.      Assessment:       1. Acute pain of right shoulder    2. Morbid obesity with BMI of 45.0-49.9, adult        Plan:   Acute pain of right shoulder  -     Ambulatory Referral to Physical/Occupational Therapy  -     diclofenac sodium (VOLTAREN) 1 % Gel; Apply 2 g topically 4 (four) times daily.  Dispense: 1 Tube; Refill: 2    Morbid obesity with BMI of 45.0-49.9, adult  Comments:  chronic condition likely a contributing factor        Follow-up if symptoms worsen or fail to improve.

## 2018-03-13 NOTE — LETTER
March 13, 2018      [facility]  [id number] [place] [address]  Phone: [phone]  Fax: [phone]       Patient: Lorne Hector   YOB: 1990  Date of Visit: 03/13/2018    To Whom It May Concern:    Talita Hector  was at [facility] on 03/13/2018. He may return to work/school on 3/14 with restrictions: no lifting >10 pounds overhead. If you have any questions or concerns, or if I can be of further assistance, please do not hesitate to contact me.    Sincerely,    Ana Rosa Reilly NP

## 2018-03-14 ENCOUNTER — TELEPHONE (OUTPATIENT)
Dept: INTERNAL MEDICINE | Facility: CLINIC | Age: 28
End: 2018-03-14

## 2018-03-14 NOTE — TELEPHONE ENCOUNTER
Patient states he was in the office yesterday and a work excuse was given with restrictions. Patient states the excuse needs to have an end date for the restrictions, please advise.

## 2018-12-10 ENCOUNTER — OFFICE VISIT (OUTPATIENT)
Dept: INTERNAL MEDICINE | Facility: CLINIC | Age: 28
End: 2018-12-10
Payer: COMMERCIAL

## 2018-12-10 VITALS
OXYGEN SATURATION: 98 % | HEIGHT: 75 IN | HEART RATE: 91 BPM | SYSTOLIC BLOOD PRESSURE: 144 MMHG | TEMPERATURE: 98 F | DIASTOLIC BLOOD PRESSURE: 90 MMHG | WEIGHT: 315 LBS | BODY MASS INDEX: 39.17 KG/M2

## 2018-12-10 DIAGNOSIS — M54.50 LOW BACK PAIN WITHOUT SCIATICA, UNSPECIFIED BACK PAIN LATERALITY, UNSPECIFIED CHRONICITY: Primary | ICD-10-CM

## 2018-12-10 DIAGNOSIS — M25.511 ACUTE PAIN OF RIGHT SHOULDER: ICD-10-CM

## 2018-12-10 PROCEDURE — 99213 OFFICE O/P EST LOW 20 MIN: CPT | Mod: S$GLB,,, | Performed by: FAMILY MEDICINE

## 2018-12-10 PROCEDURE — 99999 PR PBB SHADOW E&M-EST. PATIENT-LVL III: CPT | Mod: PBBFAC,,, | Performed by: FAMILY MEDICINE

## 2018-12-10 RX ORDER — CYCLOBENZAPRINE HCL 10 MG
5 TABLET ORAL 3 TIMES DAILY
Qty: 15 TABLET | Refills: 0 | Status: SHIPPED | OUTPATIENT
Start: 2018-12-10 | End: 2018-12-20

## 2018-12-10 NOTE — PROGRESS NOTES
Lorne Nabil Hector  12/11/2018  [id number]    Primary Doctor No  Patient Care Team:  Primary Doctor No as PCP - General  Has the patient seen any provider outside of the [facility] network since the last visit? (no). If yes, HIPPA forms completed and records requested.        Visit Type:an urgent visit for a new problem    Chief Complaint:  Chief Complaint   Patient presents with    Back Pain     been going on for about a month. lower back pain when standing for long period of time and sometimes when sitting. He said friday morning he couldn't bend over       History of Present Illness:  28 year old here with lower back, worse last week. But has been there for a month or so. He doesn't not recall doing any thing to make it come.  He reports a sharp shooting pain. He reports prolonged sitting and standing makes it worse.    He did take the muscle relaxer that he had on hand for his knee and it helped.   He reports he also took either a mobic or naprosyn to help.     He denies any numbness or tingling.    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        History:  History reviewed. No pertinent past medical history.  Past Surgical History:   Procedure Laterality Date    WISDOM TOOTH EXTRACTION       Family History   Problem Relation Age of Onset    Asthma Mother     Heart disease Mother     Hypertension Mother     Hypertension Father     Asthma Sister     Diabetes Son     Diabetes Paternal Aunt      Social History     Socioeconomic History    Marital status: Single     Spouse name: Not on file    Number of children: Not on file    Years of education: Not on file    Highest education level: Not on file   Social Needs    Financial resource strain: Not on file    Food insecurity - worry: Not on file    Food insecurity - inability: Not on file    Transportation needs - medical: Not on file    Transportation needs - non-medical: Not on file   Occupational History     Employer: [employer]    Tobacco Use    Smoking status: Never Smoker    Smokeless tobacco: Never Used   Substance and Sexual Activity    Alcohol use: Yes     Comment: Occasionally    Drug use: No    Sexual activity: No   Other Topics Concern    Not on file   Social History Narrative    Not on file     Patient Active Problem List   Diagnosis    Acute medial meniscus tear of right knee    Cyst of meniscus of right knee    Iliotibial band syndrome of right side     Review of patient's allergies indicates:   Allergen Reactions    [place] Swelling     Face edema       The following were reviewed at this visit: active problem list, medication list, allergies, family history, social history, and health maintenance.    Medications:  Current Outpatient Medications on File Prior to Visit   Medication Sig Dispense Refill    meloxicam (MOBIC ORAL) Take by mouth.      NAPROXEN ORAL Take by mouth.       No current facility-administered medications on file prior to visit.        Medications have been reviewed and reconciled with patient at this visit.  Barriers to medications present (no)    Adverse reactions to current medications (no)    Over the counter medications reviewed (Yes ), and if needed added to active Medication list at this visit.     Exam:  Wt Readings from Last 3 Encounters:   12/10/18 (!) 182.7 kg (402 lb 12.5 oz)   03/13/18 (!) 176 kg (388 lb 0.2 oz)   02/23/18 (!) 176.6 kg (389 lb 5.3 oz)     Temp Readings from Last 3 Encounters:   12/10/18 97.7 °F (36.5 °C) (Tympanic)   03/13/18 97.6 °F (36.4 °C) (Tympanic)   02/23/18 98.5 °F (36.9 °C) (Tympanic)     BP Readings from Last 3 Encounters:   12/10/18 (!) 144/90   03/13/18 (!) 150/90   02/23/18 136/84     Pulse Readings from Last 3 Encounters:   12/10/18 91   03/13/18 96   02/23/18 78     Body mass index is 50.34 kg/m².      Review of Systems   Constitutional: Negative.  Negative for chills and fever.   HENT: Negative.  Negative for congestion, sinus pain and sore throat.     Eyes: Negative for blurred vision and double vision.   Respiratory: Negative for cough, sputum production, shortness of breath and wheezing.    Cardiovascular: Negative for chest pain, palpitations and leg swelling.   Gastrointestinal: Negative for abdominal pain, constipation, diarrhea, heartburn, nausea and vomiting.   Genitourinary: Negative.    Musculoskeletal: Positive for back pain.   Skin: Negative.  Negative for rash.   Neurological: Negative.    Endo/Heme/Allergies: Negative.  Negative for polydipsia. Does not bruise/bleed easily.   Psychiatric/Behavioral: Negative for depression and substance abuse.     Physical Exam   Constitutional: He is oriented to person, place, and time. He appears well-developed and well-nourished. No distress.   HENT:   Head: Normocephalic and atraumatic.   Right Ear: External ear normal.   Left Ear: External ear normal.   Nose: Nose normal.   Mouth/Throat: Oropharynx is clear and moist. No oropharyngeal exudate.   Eyes: Conjunctivae and EOM are normal. Pupils are equal, round, and reactive to light. Right eye exhibits no discharge. Left eye exhibits no discharge.   Neck: Normal range of motion. Neck supple. No thyromegaly present.   Cardiovascular: Normal rate, regular rhythm, normal heart sounds and intact distal pulses.   No murmur heard.  Pulmonary/Chest: Effort normal and breath sounds normal. No respiratory distress. He has no wheezes.   Abdominal: Soft. Bowel sounds are normal. He exhibits no distension and no mass. There is no tenderness.   Musculoskeletal: Normal range of motion. He exhibits tenderness. He exhibits no edema.        Right shoulder: He exhibits no pain.        Thoracic back: He exhibits pain and spasm.        Arms:  Lymphadenopathy:     He has no cervical adenopathy.   Neurological: He is alert and oriented to person, place, and time. No cranial nerve deficit.   Skin: Capillary refill takes less than 2 seconds. He is not diaphoretic.   Psychiatric: He has  a normal mood and affect. His behavior is normal. Judgment and thought content normal.   Nursing note and vitals reviewed.      Laboratory Reviewed ({N/A)  Lab Results   Component Value Date    WBC 6.35 01/27/2012    HGB 15.1 01/27/2012    HCT 44.1 01/27/2012     01/27/2012    CHOL 166 01/27/2012    TRIG 98 01/27/2012    HDL 34 (L) 01/27/2012    ALT 40 01/27/2012    AST 40 01/27/2012     01/27/2012    K 4.3 01/27/2012     01/27/2012    CREATININE 1.0 01/27/2012    BUN 12 01/27/2012    CO2 26 01/27/2012    TSH 1.979 01/27/2012       Lorne was seen today for back pain.    Diagnoses and all orders for this visit:    Low back pain without sciatica, unspecified back pain laterality, unspecified chronicity      -     cyclobenzaprine (FLEXERIL) 10 MG tablet; Take 0.5 tablets (5 mg total) by mouth 3 (three) times daily. for 10 days      Heat Rest    Recheck BP as NV in 1 week  BP elevated on exam today          Care Plan/Goals: Reviewed  (N/A)  Goals     None          Follow up: No Follow-up on file.    After visit summary was printed and given to patient upon discharge today.  Patient goals and care plan are included in After Visit Summary.

## 2019-04-09 ENCOUNTER — OFFICE VISIT (OUTPATIENT)
Dept: INTERNAL MEDICINE | Facility: CLINIC | Age: 29
End: 2019-04-09
Payer: COMMERCIAL

## 2019-04-09 VITALS
WEIGHT: 315 LBS | TEMPERATURE: 97 F | HEIGHT: 75 IN | HEART RATE: 98 BPM | OXYGEN SATURATION: 97 % | SYSTOLIC BLOOD PRESSURE: 128 MMHG | DIASTOLIC BLOOD PRESSURE: 84 MMHG | BODY MASS INDEX: 39.17 KG/M2

## 2019-04-09 DIAGNOSIS — S46.911A STRAIN OF RIGHT SHOULDER, INITIAL ENCOUNTER: Primary | ICD-10-CM

## 2019-04-09 DIAGNOSIS — S83.241A ACUTE MEDIAL MENISCUS TEAR OF RIGHT KNEE, INITIAL ENCOUNTER: ICD-10-CM

## 2019-04-09 PROCEDURE — 99213 PR OFFICE/OUTPT VISIT, EST, LEVL III, 20-29 MIN: ICD-10-PCS | Mod: S$GLB,,, | Performed by: FAMILY MEDICINE

## 2019-04-09 PROCEDURE — 99999 PR PBB SHADOW E&M-EST. PATIENT-LVL III: ICD-10-PCS | Mod: PBBFAC,,, | Performed by: FAMILY MEDICINE

## 2019-04-09 PROCEDURE — 99999 PR PBB SHADOW E&M-EST. PATIENT-LVL III: CPT | Mod: PBBFAC,,, | Performed by: FAMILY MEDICINE

## 2019-04-09 PROCEDURE — 99213 OFFICE O/P EST LOW 20 MIN: CPT | Mod: S$GLB,,, | Performed by: FAMILY MEDICINE

## 2019-04-09 NOTE — PROGRESS NOTES
Lorne Ferrer Krunal  04/09/2019  [id number]    Primary Doctor No  Patient Care Team:  Primary Doctor No as PCP - General        Chief Complaint:  Chief Complaint   Patient presents with    Knee Pain     right side. has been going on for years. patient had a slip yesterday but he was able to catch himself but that made his knee start back hurting    Shoulder Pain     right side. Has been going on for years       History of Present Illness:  This patient states he is primarily here for problems with his right shoulder that had been off and on for several months but the last few weeks have been bothering him more.  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  He states that he has noticed more pain in the right anterior shoulder region and particularly when he tries to lift his arm up to the side or in front of him.  He is noticed mild weakness associated with the pain but nothing major.  He does need done excuse for missing work today.    Patient states that his right knee continues to be somewhat of a problem but is not constantly inhibiting him but yesterday it felt especially tired and leaving work he almost slipped but caught himself but noticed his knee was more sore last night but is better today.  He denies it giving out on him or locking in place and says that he cannot afford to take time off to have surgery at this time.        History:  History reviewed. No pertinent past medical history.  Past Surgical History:   Procedure Laterality Date    WISDOM TOOTH EXTRACTION       Family History   Problem Relation Age of Onset    Asthma Mother     Heart disease Mother     Hypertension Mother     Hypertension Father     Asthma Sister     Diabetes Son     Diabetes Paternal Aunt      Social History     Socioeconomic History    Marital status: Single     Spouse name: Not on file    Number of  children: Not on file    Years of education: Not on file    Highest education level: Not on file   Occupational History     Employer: [employer]   Social Needs    Financial resource strain: Not on file    Food insecurity:     Worry: Not on file     Inability: Not on file    Transportation needs:     Medical: Not on file     Non-medical: Not on file   Tobacco Use    Smoking status: Never Smoker    Smokeless tobacco: Never Used   Substance and Sexual Activity    Alcohol use: Yes     Comment: Occasionally    Drug use: No    Sexual activity: Never   Lifestyle    Physical activity:     Days per week: Not on file     Minutes per session: Not on file    Stress: Not on file   Relationships    Social connections:     Talks on phone: Not on file     Gets together: Not on file     Attends Zoroastrianism service: Not on file     Active member of club or organization: Not on file     Attends meetings of clubs or organizations: Not on file     Relationship status: Not on file   Other Topics Concern    Not on file   Social History Narrative    Not on file     Patient Active Problem List   Diagnosis    Acute medial meniscus tear of right knee    Cyst of meniscus of right knee    Iliotibial band syndrome of right side     Review of patient's allergies indicates:   Allergen Reactions    [place] Swelling     Face edema       The following were reviewed at this visit: active problem list, medication list, allergies, family history, social history, and health maintenance.    Medications:  Current Outpatient Medications on File Prior to Visit   Medication Sig Dispense Refill    meloxicam (MOBIC ORAL) Take by mouth.      NAPROXEN ORAL Take by mouth.       No current facility-administered medications on file prior to visit.        Medications have been reviewed and reconciled with patient at this visit.      Exam:  Wt Readings from Last 3 Encounters:   04/09/19 (!) 183.5 kg (404 lb 8.7 oz)   12/10/18 (!) 182.7 kg (402 lb  12.5 oz)   03/13/18 (!) 176 kg (388 lb 0.2 oz)     Temp Readings from Last 3 Encounters:   04/09/19 97 °F (36.1 °C) (Tympanic)   12/10/18 97.7 °F (36.5 °C) (Tympanic)   03/13/18 97.6 °F (36.4 °C) (Tympanic)     BP Readings from Last 3 Encounters:   04/09/19 128/84   12/10/18 (!) 144/90   03/13/18 (!) 150/90     Pulse Readings from Last 3 Encounters:   04/09/19 98   12/10/18 91   03/13/18 96     Body mass index is 50.56 kg/m².      Review of Systems   Constitutional: Negative for chills, fever and weight loss.   Respiratory: Negative for shortness of breath.    Cardiovascular: Negative for chest pain and palpitations.     Physical Exam alert and oriented, well-nourished well-developed ambulatory adult male and in no acute distress    Right shoulder-patient is able to abduct to 100 and 20°.  Patient has mildly inhibited external rotation and moderately decreased internal rotation with impingement and pain.  Clavicle and AC joint or nontender but there is some tenderness at the biceps tendon on the anterior shoulder.  No tenderness to palpation laterally or posteriorly.  No atrophy, neurovascular intact, good strength.    Right knee-no deformity swelling or discoloration.  Full range of motion from 0° to 120 deg of flexion  The joint is stable  Lachman's neg.  and no varus or valgus laxity.  Patella as intact with normal tracking.  Neurovascular intact  Good strength and no atrophy noted.        Lorne was seen today for knee pain and shoulder pain.    Diagnoses and all orders for this visit:    Strain of right shoulder, initial encounter  -     Ambulatory consult to Physical Therapy    Acute medial meniscus tear of right knee, initial encounter  -     Ambulatory consult to Physical Therapy      Discussion-right shoulder should benefit from physical therapy to improve range of motion and proper balance of strength of rotator cuff and accessory musculature as patient has a definite rounds shoulder position.  MRI may be  needed if symptoms worsened    Right knee is not having mechanical factors at this point in the patient does not want to have repair of his meniscus at this juncture.            Follow up: Follow up in about 6 weeks (around 5/21/2019) for my clinic ( sports med).    After visit summary was printed and given to patient upon discharge today.  Patient goals and care plan are included in After Visit Summary.

## 2019-05-21 ENCOUNTER — OFFICE VISIT (OUTPATIENT)
Dept: INTERNAL MEDICINE | Facility: CLINIC | Age: 29
End: 2019-05-21
Payer: COMMERCIAL

## 2019-05-21 VITALS
HEIGHT: 75 IN | SYSTOLIC BLOOD PRESSURE: 138 MMHG | BODY MASS INDEX: 39.17 KG/M2 | DIASTOLIC BLOOD PRESSURE: 86 MMHG | HEART RATE: 91 BPM | WEIGHT: 315 LBS | TEMPERATURE: 99 F | OXYGEN SATURATION: 96 %

## 2019-05-21 DIAGNOSIS — K52.9 ENTERITIS: Primary | ICD-10-CM

## 2019-05-21 PROCEDURE — 99214 PR OFFICE/OUTPT VISIT, EST, LEVL IV, 30-39 MIN: ICD-10-PCS | Mod: S$GLB,,, | Performed by: PHYSICIAN ASSISTANT

## 2019-05-21 PROCEDURE — 99999 PR PBB SHADOW E&M-EST. PATIENT-LVL III: ICD-10-PCS | Mod: PBBFAC,,, | Performed by: PHYSICIAN ASSISTANT

## 2019-05-21 PROCEDURE — 99214 OFFICE O/P EST MOD 30 MIN: CPT | Mod: S$GLB,,, | Performed by: PHYSICIAN ASSISTANT

## 2019-05-21 PROCEDURE — 99999 PR PBB SHADOW E&M-EST. PATIENT-LVL III: CPT | Mod: PBBFAC,,, | Performed by: PHYSICIAN ASSISTANT

## 2019-05-21 RX ORDER — ONDANSETRON 8 MG/1
8 TABLET, ORALLY DISINTEGRATING ORAL 3 TIMES DAILY
Qty: 15 TABLET | Refills: 0 | Status: SHIPPED | OUTPATIENT
Start: 2019-05-21

## 2019-05-21 NOTE — PATIENT INSTRUCTIONS
Noninfectious Gastroenteritis (Ages 6 Years to Adult)    Gastroenteritis can cause nausea, vomiting, diarrhea, and abdominal cramping. This may occur as a result of food sensitivity, inflammation of your gastrointestinal tract, medicines, stress, or other causes not related to infection. Your symptoms will usually last from 1 to 3 days, but can last longer. Antibiotics are not effective, but simple home treatment will be helpful.  Home care  Medicine  · You may use acetaminophen or NSAID medicines like ibuprofen or naproxen to control fever, unless another medicine is prescribed. (Note: If you have chronic liver or kidney disease, or ever had a stomach ulcer or gastrointestinalI bleeding, talk with your healthcare provider before using these medicines.) Aspirin should never be used in anyone under 18 years of age who is ill with a fever. It may cause severe liver damage. Don't increase your NSAID medicines if you are already taking these medicines for another condition (like arthritis). Don't use NSAIDS if you are on aspirin (such as for heart disease, or after a stroke).  · If medicines for diarrhea or vomiting are prescribed, take only as directed.  General care and preventing spread of the illness  · If symptoms are severe, rest at home for the next 24 hours or until you feel better.  · Hand washing with soap and water is the best way to prevent the spread of infection. Wash your hands after touching anyone who is sick.  · Wash your hands after using the toilet and before meals. Clean the toilet after each use.  · Caffeine, tobacco, and alcohol can make your diarrhea, cramping, and pain worse.  Diet  · Water and clear liquids are important so you do not get dehydrated. Drink a small amount at a time.  · Do not force yourself to eat, especially if you have cramps, vomiting, or diarrhea. When you finally decide to start eating, do not eat large amounts at a time, even if you are hungry.  · If you eat, avoid  fatty, greasy, spicy, or fried foods.  · Do not eat dairy products if you have diarrhea; they can make the diarrhea worse.  During the first 24 hours (the first full day), follow the diet below:  · Beverages: Water, clear liquids, soft drinks without caffeine, like ginger ale; mineral water (plain or flavored); decaffeinated tea and coffee.  · Soups: Clear broth, consommé, and bouillon Sports drinks aren't a good choice because they have too much sugar and not enough electrolytes. In this case, commercially available products called oral rehydration solutions are best.  · Desserts: Plain gelatin, popsicles, and fruit juice bars.  During the next 24 hours (the second day), you may add the following to the above if you have improved. If not, continue what you did the first day:  · Hot cereal, plain toast, bread, rolls, crackers  · Plain noodles, rice, mashed potatoes, chicken noodle or rice soup  · Unsweetened canned fruit (avoid pineapple), bananas  · Limit caffeine and chocolate. No spices or seasonings except salt.  During the next 24 hours  · Gradually resume a normal diet, as you feel better and your symptoms improve.  · If at any time your symptoms start getting worse, go back to clear liquids until you feel better.  Food preparation  · If you have diarrhea, you should not prepare food for others. When you  prepare food for yourself, wash your hands before and after.  · Wash your hands after using cutting boards, countertops, and knives that have been in contact with raw food.  · Keep uncooked meats away from cooked and ready-to-eat foods.  Follow-up care  Follow up with your healthcare provider if you are not improving over the next 2 to 3 days, or as advised. If a stool (diarrhea) sample was taken, call for the results as directed.  When to seek medical care  Call your healthcare provider right away if any of these occur:   · Increasing abdominal pain or constant lower right abdominal pain  · Continued  vomiting (unable to keep liquids down)  · Frequent diarrhea (more than 5 times a day)  · Blood in vomit or stool (black or red color)  · Inability to tolerate solid food after a few days.  · Dark urine, reduced urine output  · Weakness, dizziness  · Drowsiness  · Fever of 100.4ºF (38.0ºC) or higher, or as directed by your healthcare provider  · New rash  Call 911  Call 911 if any of these occur:  · Trouble breathing  · Chest pain  · Confusion  · Severe drowsiness or trouble awakening  · Seizure  · Stiff neck  Date Last Reviewed: 11/16/2015  © 2270-5063 [employer] [address] [place] [address], [place] [address]. All rights reserved. This information is not intended as a substitute for professional medical care. Always follow your healthcare professional's instructions.

## 2019-05-22 DIAGNOSIS — M25.511 CHRONIC RIGHT SHOULDER PAIN: ICD-10-CM

## 2019-05-22 DIAGNOSIS — G89.29 CHRONIC RIGHT SHOULDER PAIN: ICD-10-CM

## 2019-05-22 DIAGNOSIS — M25.561 CHRONIC PAIN OF RIGHT KNEE: Primary | ICD-10-CM

## 2019-05-22 DIAGNOSIS — G89.29 CHRONIC PAIN OF RIGHT KNEE: Primary | ICD-10-CM

## 2019-05-22 NOTE — PROGRESS NOTES
Subjective:       Patient ID: Lorne Hector is a 28 y.o. male.    Chief Complaint: Emesis; Diarrhea; and Back Pain    Emesis    This is a new problem. The current episode started yesterday. The problem occurs 5 to 10 times per day. The problem has been gradually improving. The emesis has an appearance of stomach contents. There has been no fever. Associated symptoms include abdominal pain and diarrhea. Pertinent negatives include no chest pain, chills or fever. Risk factors include suspect food intake. He has tried diet change and increased fluids for the symptoms. The treatment provided moderate relief.   History reviewed. No pertinent past medical history.    Review of Systems   Constitutional: Positive for fatigue. Negative for chills and fever.   Respiratory: Negative for chest tightness and shortness of breath.    Cardiovascular: Negative for chest pain.   Gastrointestinal: Positive for abdominal pain, diarrhea and vomiting.       Objective:      Physical Exam   Constitutional: He appears well-developed and well-nourished. No distress.   Cardiovascular: Normal rate and regular rhythm. Exam reveals no gallop and no friction rub.   No murmur heard.  Pulmonary/Chest: Effort normal and breath sounds normal. No stridor. No respiratory distress. He has no wheezes. He has no rales. He exhibits no tenderness.   Abdominal: Soft. He exhibits no distension and no mass. There is no tenderness. There is no rebound and no guarding. No hernia.   Skin: He is not diaphoretic.   Nursing note and vitals reviewed.      Assessment:       1. Enteritis        Plan:       Enteritis    Other orders  -     ondansetron (ZOFRAN-ODT) 8 MG TbDL; Take 1 tablet (8 mg total) by mouth 3 (three) times daily.  Dispense: 15 tablet; Refill: 0

## 2019-09-06 ENCOUNTER — OFFICE VISIT (OUTPATIENT)
Dept: INTERNAL MEDICINE | Facility: CLINIC | Age: 29
End: 2019-09-06
Payer: COMMERCIAL

## 2019-09-06 VITALS
HEART RATE: 79 BPM | HEIGHT: 75 IN | WEIGHT: 315 LBS | TEMPERATURE: 98 F | BODY MASS INDEX: 39.17 KG/M2 | OXYGEN SATURATION: 97 % | DIASTOLIC BLOOD PRESSURE: 84 MMHG | SYSTOLIC BLOOD PRESSURE: 132 MMHG

## 2019-09-06 DIAGNOSIS — M25.561 ACUTE PAIN OF RIGHT KNEE: Primary | ICD-10-CM

## 2019-09-06 PROCEDURE — 99999 PR PBB SHADOW E&M-EST. PATIENT-LVL III: CPT | Mod: PBBFAC,,, | Performed by: PHYSICIAN ASSISTANT

## 2019-09-06 PROCEDURE — 99214 OFFICE O/P EST MOD 30 MIN: CPT | Mod: S$GLB,,, | Performed by: PHYSICIAN ASSISTANT

## 2019-09-06 PROCEDURE — 99999 PR PBB SHADOW E&M-EST. PATIENT-LVL III: ICD-10-PCS | Mod: PBBFAC,,, | Performed by: PHYSICIAN ASSISTANT

## 2019-09-06 PROCEDURE — 99214 PR OFFICE/OUTPT VISIT, EST, LEVL IV, 30-39 MIN: ICD-10-PCS | Mod: S$GLB,,, | Performed by: PHYSICIAN ASSISTANT

## 2019-09-06 RX ORDER — CYCLOBENZAPRINE HCL 10 MG
10 TABLET ORAL 3 TIMES DAILY PRN
COMMUNITY

## 2019-09-06 RX ORDER — MELOXICAM 15 MG/1
15 TABLET ORAL DAILY
Qty: 90 TABLET | Refills: 1 | Status: SHIPPED | OUTPATIENT
Start: 2019-09-06

## 2019-09-06 NOTE — PROGRESS NOTES
Subjective:       Patient ID: Lorne Hector is a 29 y.o. male.    Chief Complaint: right knee pain (PCP - none)    Knee Pain    The incident occurred more than 1 week ago. The injury mechanism is unknown. The pain is present in the right knee. The quality of the pain is described as shooting. The pain is moderate. The pain has been fluctuating since onset. Pertinent negatives include no inability to bear weight, loss of motion, loss of sensation, muscle weakness, numbness or tingling. The symptoms are aggravated by movement, palpation and weight bearing. He has tried nothing for the symptoms. The treatment provided no relief.   No past medical history on file.    Review of Systems   Constitutional: Negative for chills and fatigue.   Respiratory: Negative for chest tightness and shortness of breath.    Cardiovascular: Negative for chest pain.   Gastrointestinal: Negative for abdominal pain.   Neurological: Negative for tingling and numbness.       Objective:      Physical Exam   Constitutional: He appears well-developed and well-nourished. No distress.   Neck: Neck supple.   Cardiovascular: Normal rate and regular rhythm. Exam reveals no gallop and no friction rub.   Pulmonary/Chest: Effort normal and breath sounds normal.   Musculoskeletal:        Right knee: He exhibits normal range of motion and no swelling. Tenderness found. Lateral joint line tenderness noted.   Lymphadenopathy:     He has no cervical adenopathy.   Skin: He is not diaphoretic.   Nursing note and vitals reviewed.      Assessment:       1. Acute pain of right knee        Plan:       Acute pain of right knee    Other orders  -     meloxicam (MOBIC) 15 MG tablet; Take 1 tablet (15 mg total) by mouth once daily.  Dispense: 90 tablet; Refill: 1

## 2020-02-25 ENCOUNTER — OFFICE VISIT (OUTPATIENT)
Dept: INTERNAL MEDICINE | Facility: CLINIC | Age: 30
End: 2020-02-25
Payer: COMMERCIAL

## 2020-02-25 VITALS
HEIGHT: 75 IN | TEMPERATURE: 98 F | SYSTOLIC BLOOD PRESSURE: 144 MMHG | DIASTOLIC BLOOD PRESSURE: 80 MMHG | OXYGEN SATURATION: 98 % | BODY MASS INDEX: 39.17 KG/M2 | WEIGHT: 315 LBS | HEART RATE: 85 BPM

## 2020-02-25 DIAGNOSIS — R10.9 ABDOMINAL PAIN, UNSPECIFIED ABDOMINAL LOCATION: ICD-10-CM

## 2020-02-25 DIAGNOSIS — Z00.00 ANNUAL PHYSICAL EXAM: Primary | ICD-10-CM

## 2020-02-25 PROCEDURE — 99999 PR PBB SHADOW E&M-EST. PATIENT-LVL III: ICD-10-PCS | Mod: PBBFAC,,, | Performed by: FAMILY MEDICINE

## 2020-02-25 PROCEDURE — 99214 PR OFFICE/OUTPT VISIT, EST, LEVL IV, 30-39 MIN: ICD-10-PCS | Mod: S$GLB,,, | Performed by: FAMILY MEDICINE

## 2020-02-25 PROCEDURE — 99999 PR PBB SHADOW E&M-EST. PATIENT-LVL III: CPT | Mod: PBBFAC,,, | Performed by: FAMILY MEDICINE

## 2020-02-25 PROCEDURE — 3008F BODY MASS INDEX DOCD: CPT | Mod: CPTII,S$GLB,, | Performed by: FAMILY MEDICINE

## 2020-02-25 PROCEDURE — 99214 OFFICE O/P EST MOD 30 MIN: CPT | Mod: S$GLB,,, | Performed by: FAMILY MEDICINE

## 2020-02-25 PROCEDURE — 3008F PR BODY MASS INDEX (BMI) DOCUMENTED: ICD-10-PCS | Mod: CPTII,S$GLB,, | Performed by: FAMILY MEDICINE

## 2020-02-25 RX ORDER — PANTOPRAZOLE SODIUM 40 MG/1
40 TABLET, DELAYED RELEASE ORAL DAILY
Qty: 30 TABLET | Refills: 2 | Status: SHIPPED | OUTPATIENT
Start: 2020-02-25 | End: 2021-02-24

## 2020-02-25 NOTE — PROGRESS NOTES
Lorne Hector  02/25/2020  [id number]    Primary Doctor No  Patient Care Team:  Primary Doctor No as PCP - General        Chief Complaint:  Chief Complaint   Patient presents with    stomach pain       History of Present Illness:   Lorne Hector 29 y.o. male  complains of noticing abdominal pain off and on over the last month or to which seems to be exacerbated by drinking alcohol or eating hot sauce.  He states the pain is sometimes in the mid abdomen and sometimes radiates a sharp pain to the right upper quadrant area.    No fever weight loss chills and no nausea vomiting or diarrhea.  The patient states he would like to have general physical done with his lab work also since it has been over a year.      History:  No past medical history on file.  Past Surgical History:   Procedure Laterality Date    WISDOM TOOTH EXTRACTION       Family History   Problem Relation Age of Onset    Asthma Mother     Heart disease Mother     Hypertension Mother     Hypertension Father     Asthma Sister     Diabetes Son     Diabetes Paternal Aunt      Social History     Socioeconomic History    Marital status: Single     Spouse name: Not on file    Number of children: Not on file    Years of education: Not on file    Highest education level: Not on file   Occupational History     Employer: [employer]   Social Needs    Financial resource strain: Not on file    Food insecurity:     Worry: Not on file     Inability: Not on file    Transportation needs:     Medical: Not on file     Non-medical: Not on file   Tobacco Use    Smoking status: Never Smoker    Smokeless tobacco: Never Used   Substance and Sexual Activity    Alcohol use: Yes     Comment: Occasionally    Drug use: No    Sexual activity: Never   Lifestyle    Physical activity:     Days per week: Not on file     Minutes per session: Not on file    Stress: Not on file   Relationships    Social connections:     Talks on phone: Not on file      Gets together: Not on file     Attends Evangelical service: Not on file     Active member of club or organization: Not on file     Attends meetings of clubs or organizations: Not on file     Relationship status: Not on file   Other Topics Concern    Not on file   Social History Narrative    Not on file     Patient Active Problem List   Diagnosis    Acute medial meniscus tear of right knee    Cyst of meniscus of right knee    Iliotibial band syndrome of right side    Annual physical exam    Abdominal pain     Review of patient's allergies indicates:   Allergen Reactions    Emre Swelling     Face edema       The following were reviewed at this visit: active problem list, medication list, allergies, family history, social history, and health maintenance.    Medications:  Current Outpatient Medications on File Prior to Visit   Medication Sig Dispense Refill    meloxicam (MOBIC) 15 MG tablet Take 1 tablet (15 mg total) by mouth once daily. 90 tablet 1    cyclobenzaprine (FLEXERIL) 10 MG tablet Take 10 mg by mouth 3 (three) times daily as needed for Muscle spasms.      NAPROXEN ORAL Take by mouth.      ondansetron (ZOFRAN-ODT) 8 MG TbDL Take 1 tablet (8 mg total) by mouth 3 (three) times daily. (Patient not taking: Reported on 2/25/2020) 15 tablet 0     No current facility-administered medications on file prior to visit.        Medications have been reviewed and reconciled with patient at this visit.      Exam:  Wt Readings from Last 3 Encounters:   02/25/20 (!) 185.7 kg (409 lb 6.3 oz)   09/06/19 (!) 182.5 kg (402 lb 5.4 oz)   05/21/19 (!) 184.4 kg (406 lb 8.5 oz)     Temp Readings from Last 3 Encounters:   02/25/20 97.6 °F (36.4 °C) (Tympanic)   09/06/19 97.9 °F (36.6 °C) (Tympanic)   05/21/19 98.9 °F (37.2 °C) (Tympanic)     BP Readings from Last 3 Encounters:   02/25/20 (!) 144/80   09/06/19 132/84   05/21/19 138/86     Pulse Readings from Last 3 Encounters:   02/25/20 85   09/06/19 79   05/21/19 91      Body mass index is 51.17 kg/m².      Review of Systems   Constitutional: Negative for chills, diaphoresis, fever, malaise/fatigue and weight loss.   HENT: Negative for congestion, ear discharge, hearing loss, nosebleeds, sinus pain, sore throat and tinnitus.    Eyes: Negative for blurred vision, photophobia, discharge and redness.   Respiratory: Negative for cough, hemoptysis, sputum production, shortness of breath and wheezing.    Cardiovascular: Negative for chest pain, palpitations, orthopnea and claudication.   Gastrointestinal: Negative for abdominal pain, blood in stool, constipation, diarrhea, heartburn, nausea and vomiting.   Genitourinary: Negative for dysuria, flank pain, frequency and hematuria.   Musculoskeletal: Negative for back pain, falls, joint pain, myalgias and neck pain.   Skin: Negative for rash.   Neurological: Negative for dizziness, tingling, tremors, focal weakness, seizures, loss of consciousness, weakness and headaches.   Endo/Heme/Allergies: Negative for environmental allergies. Does not bruise/bleed easily.   Psychiatric/Behavioral: Negative for depression, memory loss, substance abuse and suicidal ideas. The patient is not nervous/anxious and does not have insomnia.      Physical Exam   Constitutional: He is oriented to person, place, and time. He appears well-developed and well-nourished. No distress.   Ambulatory   HENT:   Head: Normocephalic and atraumatic.   Right Ear: External ear normal.   Left Ear: External ear normal.   Nose: Nose normal.   Mouth/Throat: Oropharynx is clear and moist.   Eyes: Pupils are equal, round, and reactive to light. EOM are normal. Right eye exhibits no discharge. Left eye exhibits no discharge.   Neck: Normal range of motion. Neck supple. No thyromegaly present.   Cardiovascular: Normal rate, regular rhythm and normal heart sounds. Exam reveals no gallop and no friction rub.   No murmur heard.  Pulmonary/Chest: Effort normal and breath sounds normal.  No respiratory distress. He has no wheezes. He has no rales. He exhibits no tenderness.   Abdominal: Soft. He exhibits no distension and no mass. There is no tenderness.   Musculoskeletal: Normal range of motion. He exhibits no edema, tenderness or deformity.   Lymphadenopathy:     He has no cervical adenopathy.   Neurological: He is alert and oriented to person, place, and time. He displays normal reflexes. No cranial nerve deficit. He exhibits normal muscle tone.   Skin: Skin is warm and dry. No rash noted. No erythema.   Psychiatric: He has a normal mood and affect. His behavior is normal. Thought content normal.     WNWD, A&O      Lorne was seen today for stomach pain.    Diagnoses and all orders for this visit:    Annual physical exam  -     CBC auto differential; Future  -     Comprehensive metabolic panel; Future  -     Lipid panel; Future  -     TSH; Future    Abdominal pain, unspecified abdominal location    Other orders  -     pantoprazole (PROTONIX) 40 MG tablet; Take 1 tablet (40 mg total) by mouth once daily.        The patient verbalized good understanding of the medical issues discussed today and expressed appreciation for the care provided.  Patient was given the opportunity to ask questions and be an active participant in their medical care. Patient had no further questions or concerns at this time.   The patient was encouraged to participate in appropriate physical activity.    After visit summary was printed and given to patient upon discharge today.  Patient goals and care plan are included in After Visit Summary.    This note was produced with voice recognition software and may have sound a like errors

## 2020-02-25 NOTE — PATIENT INSTRUCTIONS
Avoid excessive hot sauce and alcohol    Take medication every morning    Come in for fasting labs

## 2020-06-01 PROBLEM — Z00.00 ANNUAL PHYSICAL EXAM: Status: RESOLVED | Noted: 2020-02-25 | Resolved: 2020-06-01

## 2024-05-22 ENCOUNTER — TELEPHONE (OUTPATIENT)
Dept: SPORTS MEDICINE | Facility: CLINIC | Age: 34
End: 2024-05-22
Payer: COMMERCIAL

## 2024-05-22 DIAGNOSIS — M25.561 RIGHT KNEE PAIN, UNSPECIFIED CHRONICITY: Primary | ICD-10-CM

## 2024-05-28 ENCOUNTER — HOSPITAL ENCOUNTER (OUTPATIENT)
Dept: RADIOLOGY | Facility: HOSPITAL | Age: 34
Discharge: HOME | End: 2024-05-28
Attending: STUDENT IN AN ORGANIZED HEALTH CARE EDUCATION/TRAINING PROGRAM
Payer: COMMERCIAL

## 2024-05-28 ENCOUNTER — OFFICE VISIT (OUTPATIENT)
Dept: SPORTS MEDICINE | Facility: CLINIC | Age: 34
End: 2024-05-28
Payer: COMMERCIAL

## 2024-05-28 DIAGNOSIS — M25.561 RIGHT KNEE PAIN, UNSPECIFIED CHRONICITY: ICD-10-CM

## 2024-05-28 DIAGNOSIS — M22.41 CHONDROMALACIA OF RIGHT PATELLA: Primary | ICD-10-CM

## 2024-05-28 DIAGNOSIS — S83.92XA SPRAIN OF LEFT KNEE, UNSPECIFIED LIGAMENT, INITIAL ENCOUNTER: ICD-10-CM

## 2024-05-28 DIAGNOSIS — M25.561 ACUTE PAIN OF RIGHT KNEE: ICD-10-CM

## 2024-05-28 PROCEDURE — 73562 X-RAY EXAM OF KNEE 3: CPT | Mod: TC,LT

## 2024-05-28 PROCEDURE — 99204 OFFICE O/P NEW MOD 45 MIN: CPT | Mod: S$GLB,,, | Performed by: STUDENT IN AN ORGANIZED HEALTH CARE EDUCATION/TRAINING PROGRAM

## 2024-05-28 PROCEDURE — 99999 PR PBB SHADOW E&M-EST. PATIENT-LVL III: CPT | Mod: PBBFAC,,, | Performed by: STUDENT IN AN ORGANIZED HEALTH CARE EDUCATION/TRAINING PROGRAM

## 2024-05-28 PROCEDURE — 73564 X-RAY EXAM KNEE 4 OR MORE: CPT | Mod: TC,RT

## 2024-05-28 PROCEDURE — 73562 X-RAY EXAM OF KNEE 3: CPT | Mod: 26,59,LT, | Performed by: RADIOLOGY

## 2024-05-28 PROCEDURE — 73564 X-RAY EXAM KNEE 4 OR MORE: CPT | Mod: 26,RT,, | Performed by: RADIOLOGY

## 2024-05-28 PROCEDURE — 1160F RVW MEDS BY RX/DR IN RCRD: CPT | Mod: CPTII,S$GLB,, | Performed by: STUDENT IN AN ORGANIZED HEALTH CARE EDUCATION/TRAINING PROGRAM

## 2024-05-28 PROCEDURE — 1159F MED LIST DOCD IN RCRD: CPT | Mod: CPTII,S$GLB,, | Performed by: STUDENT IN AN ORGANIZED HEALTH CARE EDUCATION/TRAINING PROGRAM

## 2024-05-28 RX ORDER — DICLOFENAC SODIUM 75 MG/1
75 TABLET, DELAYED RELEASE ORAL 2 TIMES DAILY WITH MEALS
Qty: 30 TABLET | Refills: 0 | Status: SHIPPED | OUTPATIENT
Start: 2024-05-28 | End: 2024-06-11 | Stop reason: SDUPTHER

## 2024-05-28 RX ORDER — TIRZEPATIDE 5 MG/.5ML
5 INJECTION, SOLUTION SUBCUTANEOUS
COMMUNITY
Start: 2024-04-15

## 2024-05-28 NOTE — PATIENT INSTRUCTIONS
Assessment:  Lorne Hector is a 33 y.o. male with a chief complaint of Pain and Injury of the Right Knee    Encounter Diagnoses   Name Primary?    Chondromalacia of right patella Yes    Sprain of left knee, unspecified ligament, initial encounter     Acute pain of right knee       Plan:  XR reviewed - early medial compartment narrowing, otherwise normal appearing radiographs of the right knee, without any obvious abnormalities.  Labs reviewed - A1c 6.7%, Cr 0.9, , LFTs WNL   History and clinical exam is consistent with an acute right knee sprain after stepping in a hole about 4 days ago.  Patient has a h/o meniscal tear from about 6 years ago, which improved with conservative management.  No locking or instability at this time.  Pain primarily about the patellofemoral space.  Recommend conservative management.  We will prescribe her diclofenac 75 mg.  Take twice daily with food for the next 2 weeks.  Recommend continued use of your knee sleeve vs brace, particularly while active.    Recommend to ice the affected knee, 2-3 times per day, for 10-15 minutes each time.  If not improving, may consider for formal physical therapy vs MRI.    Follow-up:  2 weeks or sooner if there are any problems between now and then.    Thank you for choosing [facility] and Dr. Mitchell Amaral for your orthopedic & sports medicine care. It is our goal to provide you with exceptional care that will help keep you healthy, active, and get you back in the game.    Please do not hesitate to reach out to us via email, phone, or MyChart with any questions, concerns, or feedback.    If you are experiencing pain/discomfort ,or have questions after 5pm and would like to be connected to the [facility] on-call team, please call this number and specify which Sports Medicine provider is treating you: [phone]

## 2024-05-28 NOTE — PROGRESS NOTES
Patient ID: Lorne Hector  YOB: 1990  MRN: [id number]    Chief Complaint: Pain and Injury of the Right Knee    Referred By: Self    Occupation: floor planner      History of Present Illness: Lorne Hector is a 33 y.o. male who presents today with Pain and Injury of the Right Knee    He injured his right knee on 5/19/24 when he stepped in a hole while walking his dog.  He heard a pop in the front of his knee.  He has had persistent anterior knee pain since then.  His leg gives out of him while he's walking.  It also worsens while squatting and bending.  He has a compression sleeve and has tried tylenol and ice without relief.  He has been treated in the past for this knee by Dr. Hodges and Jian for a meniscus tear, treated conservatively.    Past Medical History:   Past Medical History:   Diagnosis Date    Hypertension      Past Surgical History:   Procedure Laterality Date    WISDOM TOOTH EXTRACTION       Family History   Problem Relation Name Age of Onset    Asthma Mother      Heart disease Mother      Hypertension Mother      Hypertension Father      Asthma Sister      Diabetes Son      Diabetes Paternal Aunt       Social History     Socioeconomic History    Marital status: Single   Occupational History     Employer: [employer]   Tobacco Use    Smoking status: Never    Smokeless tobacco: Never   Substance and Sexual Activity    Alcohol use: Yes     Comment: Occasionally    Drug use: No    Sexual activity: Never     Medication List with Changes/Refills   New Medications    DICLOFENAC (VOLTAREN) 75 MG EC TABLET    Take 1 tablet (75 mg total) by mouth 2 (two) times daily with meals.   Current Medications    CYCLOBENZAPRINE (FLEXERIL) 10 MG TABLET    Take 10 mg by mouth 3 (three) times daily as needed for Muscle spasms. NOT TAKING    ONDANSETRON (ZOFRAN-ODT) 8 MG TBDL    Take 1 tablet (8 mg total) by mouth 3 (three) times daily.    PANTOPRAZOLE (PROTONIX) 40 MG TABLET     Take 1 tablet (40 mg total) by mouth once daily.    TIRZEPATIDE (MOUNJARO) 5 MG/0.5 ML PNIJ    Inject 5 mg into the skin every 7 days.   Discontinued Medications    MELOXICAM (MOBIC) 15 MG TABLET    Take 1 tablet (15 mg total) by mouth once daily.    NAPROXEN ORAL    Take by mouth. NOT TAKING     Review of patient's allergies indicates:   Allergen Reactions    Emre Swelling     Face edema       Physical Exam:   There is no height or weight on file to calculate BMI.    Physical Exam  Detailed MSK exam:     Right Knee:  Inspection:  No swelling, erythema or ecchymosis.   Palpation tenderness: Medial and lateral patella/trochlea  Range of motion: 0 deg extension - 130 deg flexion  Strength:  5/5 Extension    5/5 Flexion  Stability: Stable ACL/Lachman      Stable Posterior Drawer      Stable MCL/Valgus Stress      Stable LCL/Varus Stress   Patella Exam: Positive lateral patellar tracking   Negative Patellar apprehension   Positive Patellar grind  Meniscus Exam: Negative Artem's    No joint line pain  N/V Exam:  Tibial:    Normal sensory (plantar foot)  Normal motor (FHL)    Sup Peroneal:   Normal sensory (dorsal foot)  Normal motor (Peroneals)            Deep Peroneal:   Normal sensory (1st web space)  Normal motor (EHL)    Sural:   Normal sensory (lateral foot)   Saphenous:   Normal sensory (medial lower leg)   Normal pedal pulses, warm and well perfused with capillary refill < 2 sec     Imaging:  X-ray Shoulder 2 or More Views Right  Narrative: Technique: 4 views were obtained of the right shoulder.    Comparison: none    Findings: There is no radiographic evidence of acute osseous, articular, or soft tissue abnormality. Joint spaces are well preserved.  Impression: No acute findings.     Electronically signed by: CAROLYN ELLIS MD  Date:     02/23/18  Time:    15:48     Relevant imaging results were reviewed and interpreted by me and per my read:  Mild medial compartment narrowing.  Normal alignment.  No OCD.   No fractures or other acute abnormalities.    This was discussed with the patient and / or family today.     Patient Instructions   Assessment:  Lorne Hector is a 33 y.o. male with a chief complaint of Pain and Injury of the Right Knee    Encounter Diagnoses   Name Primary?    Chondromalacia of right patella Yes    Sprain of left knee, unspecified ligament, initial encounter     Acute pain of right knee       Plan:  XR reviewed - early medial compartment narrowing, otherwise normal appearing radiographs of the right knee, without any obvious abnormalities.  Labs reviewed - A1c 6.7%, Cr 0.9, , LFTs WNL   History and clinical exam is consistent with an acute right knee sprain after stepping in a hole about 4 days ago.  Patient has a h/o meniscal tear from about 6 years ago, which improved with conservative management.  No locking or instability at this time.  Pain primarily about the patellofemoral space.  Recommend conservative management.  We will prescribe her diclofenac 75 mg.  Take twice daily with food for the next 2 weeks.  Recommend continued use of your knee sleeve vs brace, particularly while active.    Recommend to ice the affected knee, 2-3 times per day, for 10-15 minutes each time.  If not improving, may consider for formal physical therapy vs MRI.    Follow-up:  2 weeks or sooner if there are any problems between now and then.    Thank you for choosing [facility] and Dr. Mitchell Amaral for your orthopedic & sports medicine care. It is our goal to provide you with exceptional care that will help keep you healthy, active, and get you back in the game.    Please do not hesitate to reach out to us via email, phone, or MyChart with any questions, concerns, or feedback.    If you are experiencing pain/discomfort ,or have questions after 5pm and would like to be connected to the [facility] on-call team, please call this number and  specify which Sports Medicine provider is treating you: [phone]       A copy of today's visit note has been sent to the referring provider.           Mitchell Amaral MD  Primary Care Sports Medicine    Disclaimer: This note was prepared using a voice recognition system and is likely to have sound alike errors within the text.

## 2024-06-11 ENCOUNTER — OFFICE VISIT (OUTPATIENT)
Dept: SPORTS MEDICINE | Facility: CLINIC | Age: 34
End: 2024-06-11
Payer: COMMERCIAL

## 2024-06-11 DIAGNOSIS — M25.561 ACUTE PAIN OF RIGHT KNEE: ICD-10-CM

## 2024-06-11 DIAGNOSIS — S83.92XA SPRAIN OF LEFT KNEE, UNSPECIFIED LIGAMENT, INITIAL ENCOUNTER: ICD-10-CM

## 2024-06-11 DIAGNOSIS — S86.811D STRAIN OF RIGHT PATELLAR TENDON, SUBSEQUENT ENCOUNTER: Primary | ICD-10-CM

## 2024-06-11 PROCEDURE — 1159F MED LIST DOCD IN RCRD: CPT | Mod: CPTII,S$GLB,, | Performed by: STUDENT IN AN ORGANIZED HEALTH CARE EDUCATION/TRAINING PROGRAM

## 2024-06-11 PROCEDURE — 99214 OFFICE O/P EST MOD 30 MIN: CPT | Mod: S$GLB,,, | Performed by: STUDENT IN AN ORGANIZED HEALTH CARE EDUCATION/TRAINING PROGRAM

## 2024-06-11 PROCEDURE — 99999 PR PBB SHADOW E&M-EST. PATIENT-LVL III: CPT | Mod: PBBFAC,,, | Performed by: STUDENT IN AN ORGANIZED HEALTH CARE EDUCATION/TRAINING PROGRAM

## 2024-06-11 PROCEDURE — 1160F RVW MEDS BY RX/DR IN RCRD: CPT | Mod: CPTII,S$GLB,, | Performed by: STUDENT IN AN ORGANIZED HEALTH CARE EDUCATION/TRAINING PROGRAM

## 2024-06-11 RX ORDER — DICLOFENAC SODIUM 75 MG/1
75 TABLET, DELAYED RELEASE ORAL 2 TIMES DAILY WITH MEALS
Qty: 60 TABLET | Refills: 1 | Status: SHIPPED | OUTPATIENT
Start: 2024-06-11

## 2024-06-11 NOTE — PROGRESS NOTES
Patient ID: Lorne Hector  YOB: 1990  MRN: [id number]    Chief Complaint: Pain of the Right Knee    Referred By: Self    Occupation: floor planner      History of Present Illness: Lorne Hector is a 33 y.o. male who presents today with Pain of the Right Knee    He was initially evaluated in our office on on 5/28/24 after injuring his right knee on 5/19/24 when he stepped in a hole while walking his dog.   He was diagnosed with right knee sprain with patellofemoral chondromalacia and treated with diclofenac 75mg, knee sleeve and ice.    Today, he reports mild improvement in his symptoms.  Diclofenac has been helping a bit.  He is able to walk his dog after you take some medication.  Still with significant pain and discomfort, mainly localized to the patellar tendon region.  Denies any instability or locking of the knee.      HPI 5/28/24:  He injured his right knee on 5/19/24 when he stepped in a hole while walking his dog.  He heard a pop in the front of his knee.  He has had persistent anterior knee pain since then.  His leg gives out of him while he's walking.  It also worsens while squatting and bending.  He has a compression sleeve and has tried tylenol and ice without relief.  He has been treated in the past for this knee by Dr. Hodges and Jian for a meniscus tear, treated conservatively.    Past Medical History:   Past Medical History:   Diagnosis Date    Hypertension      Past Surgical History:   Procedure Laterality Date    WISDOM TOOTH EXTRACTION       Family History   Problem Relation Name Age of Onset    Asthma Mother      Heart disease Mother      Hypertension Mother      Hypertension Father      Asthma Sister      Diabetes Son      Diabetes Paternal Aunt       Social History     Socioeconomic History    Marital status: Single   Occupational History     Employer: [employer]   Tobacco Use    Smoking status: Never    Smokeless tobacco: Never   Substance and Sexual  Activity    Alcohol use: Yes     Comment: Occasionally    Drug use: No    Sexual activity: Never     Medication List with Changes/Refills   Current Medications    CYCLOBENZAPRINE (FLEXERIL) 10 MG TABLET    Take 10 mg by mouth 3 (three) times daily as needed for Muscle spasms. NOT TAKING    ONDANSETRON (ZOFRAN-ODT) 8 MG TBDL    Take 1 tablet (8 mg total) by mouth 3 (three) times daily.    PANTOPRAZOLE (PROTONIX) 40 MG TABLET    Take 1 tablet (40 mg total) by mouth once daily.    TIRZEPATIDE (MOUNJARO) 5 MG/0.5 ML PNIJ    Inject 5 mg into the skin every 7 days.   Changed and/or Refilled Medications    Modified Medication Previous Medication    DICLOFENAC (VOLTAREN) 75 MG EC TABLET diclofenac (VOLTAREN) 75 MG EC tablet       Take 1 tablet (75 mg total) by mouth 2 (two) times daily with meals.    Take 1 tablet (75 mg total) by mouth 2 (two) times daily with meals.     Review of patient's allergies indicates:   Allergen Reactions    [place] Swelling     Face edema       Physical Exam:   There is no height or weight on file to calculate BMI.    Physical Exam  Detailed MSK exam:     Right Knee:  Inspection:  No swelling, erythema or ecchymosis.   Palpation tenderness:  Proximal patellar tendon, inferior pole patella  Range of motion: 0 deg extension - 130 deg flexion  Strength:  5/5 Extension    5/5 Flexion  Stability: Stable ACL/Lachman      Stable Posterior Drawer      Stable MCL/Valgus Stress      Stable LCL/Varus Stress   Patella Exam: Positive lateral patellar tracking   Negative Patellar apprehension   Positive Patellar grind  Meniscus Exam: Negative Artem's, negative Thessaly    No joint line pain  N/V Exam:  Tibial:    Normal sensory (plantar foot)  Normal motor (FHL)    Sup Peroneal:   Normal sensory (dorsal foot)  Normal motor (Peroneals)            Deep Peroneal:   Normal sensory (1st web space)  Normal motor (EHL)    Sural:   Normal sensory (lateral foot)   Saphenous:   Normal sensory (medial lower  leg)   Normal pedal pulses, warm and well perfused with capillary refill < 2 sec     Imaging:    No new imaging today.     Patient Instructions   Assessment:  Lorne Hector is a 33 y.o. male with a chief complaint of Pain of the Right Knee    Encounter Diagnoses   Name Primary?    Strain of right patellar tendon, subsequent encounter Yes    Acute pain of right knee     Sprain of left knee, unspecified ligament, initial encounter       Plan:  Patient with persistent right knee pain, now about 3 weeks out from injury.  Pain localizes to the proximal patellar tendon as it has origin on the patella.  No signs of meniscal pathology, joint tenderness, knee instability.    Recommend conservative management.  We will refer for PT at [facility].  Refill diclofenac 75 mg.  Take twice daily with food.  No indication for corticosteroid injection at this time.  If not improving, may consider for MRI.    Follow-up: 6 weeks or sooner if there are any problems between now and then.    Thank you for choosing [facility] and Dr. Mitchell Amaral for your orthopedic & sports medicine care. It is our goal to provide you with exceptional care that will help keep you healthy, active, and get you back in the game.    Please do not hesitate to reach out to us via email, phone, or MyChart with any questions, concerns, or feedback.    If you are experiencing pain/discomfort ,or have questions after 5pm and would like to be connected to the [facility] on-call team, please call this number and specify which Sports Medicine provider is treating you: [phone]       A copy of today's visit note has been sent to the referring provider.           Mitchell Amaral MD  Primary Care Sports Medicine    Disclaimer: This note was prepared using a voice recognition system and is likely to have sound alike errors within the text.

## 2024-06-11 NOTE — PATIENT INSTRUCTIONS
Assessment:  Lorne Hector is a 33 y.o. male with a chief complaint of Pain of the Right Knee    Encounter Diagnoses   Name Primary?    Strain of right patellar tendon, subsequent encounter Yes    Acute pain of right knee     Sprain of left knee, unspecified ligament, initial encounter       Plan:  Patient with persistent right knee pain, now about 3 weeks out from injury.  Pain localizes to the proximal patellar tendon as it has origin on the patella.  No signs of meniscal pathology, joint tenderness, knee instability.    Recommend conservative management.  We will refer for PT at [facility].  Refill diclofenac 75 mg.  Take twice daily with food.  No indication for corticosteroid injection at this time.  If not improving, may consider for MRI.    Follow-up: 6 weeks or sooner if there are any problems between now and then.    Thank you for choosing [facility] and Dr. Mitchell Amaral for your orthopedic & sports medicine care. It is our goal to provide you with exceptional care that will help keep you healthy, active, and get you back in the game.    Please do not hesitate to reach out to us via email, phone, or MyChart with any questions, concerns, or feedback.    If you are experiencing pain/discomfort ,or have questions after 5pm and would like to be connected to the [facility] on-call team, please call this number and specify which Sports Medicine provider is treating you: [phone]

## 2024-12-18 ENCOUNTER — OFFICE VISIT (OUTPATIENT)
Dept: INTERNAL MEDICINE | Facility: CLINIC | Age: 34
End: 2024-12-18
Payer: COMMERCIAL

## 2024-12-18 ENCOUNTER — LAB VISIT (OUTPATIENT)
Dept: LAB | Facility: HOSPITAL | Age: 34
End: 2024-12-18
Payer: COMMERCIAL

## 2024-12-18 VITALS
BODY MASS INDEX: 39.17 KG/M2 | DIASTOLIC BLOOD PRESSURE: 100 MMHG | WEIGHT: 315 LBS | HEIGHT: 75 IN | TEMPERATURE: 97 F | SYSTOLIC BLOOD PRESSURE: 170 MMHG | OXYGEN SATURATION: 98 % | HEART RATE: 116 BPM

## 2024-12-18 DIAGNOSIS — Z11.4 ENCOUNTER FOR SCREENING FOR HIV: ICD-10-CM

## 2024-12-18 DIAGNOSIS — Z13.29 SCREENING FOR THYROID DISORDER: ICD-10-CM

## 2024-12-18 DIAGNOSIS — E11.9 TYPE 2 DIABETES MELLITUS WITHOUT COMPLICATION, WITHOUT LONG-TERM CURRENT USE OF INSULIN: ICD-10-CM

## 2024-12-18 DIAGNOSIS — Z13.220 ENCOUNTER FOR SCREENING FOR LIPID DISORDER: ICD-10-CM

## 2024-12-18 DIAGNOSIS — R00.0 TACHYCARDIA, UNSPECIFIED: ICD-10-CM

## 2024-12-18 DIAGNOSIS — I10 ESSENTIAL HYPERTENSION, BENIGN: ICD-10-CM

## 2024-12-18 DIAGNOSIS — Z13.31 POSITIVE DEPRESSION SCREENING: ICD-10-CM

## 2024-12-18 DIAGNOSIS — Z00.00 ENCOUNTER FOR PREVENTATIVE ADULT HEALTH CARE EXAMINATION: ICD-10-CM

## 2024-12-18 DIAGNOSIS — Z00.00 ENCOUNTER FOR PREVENTATIVE ADULT HEALTH CARE EXAMINATION: Primary | ICD-10-CM

## 2024-12-18 PROCEDURE — 3008F BODY MASS INDEX DOCD: CPT | Mod: CPTII,S$GLB,, | Performed by: NURSE PRACTITIONER

## 2024-12-18 PROCEDURE — G2211 COMPLEX E/M VISIT ADD ON: HCPCS | Mod: S$GLB,,, | Performed by: NURSE PRACTITIONER

## 2024-12-18 PROCEDURE — 3077F SYST BP >= 140 MM HG: CPT | Mod: CPTII,S$GLB,, | Performed by: NURSE PRACTITIONER

## 2024-12-18 PROCEDURE — 99999 PR PBB SHADOW E&M-EST. PATIENT-LVL IV: CPT | Mod: PBBFAC,,, | Performed by: NURSE PRACTITIONER

## 2024-12-18 PROCEDURE — 3079F DIAST BP 80-89 MM HG: CPT | Mod: CPTII,S$GLB,, | Performed by: NURSE PRACTITIONER

## 2024-12-18 PROCEDURE — 4010F ACE/ARB THERAPY RXD/TAKEN: CPT | Mod: CPTII,S$GLB,, | Performed by: NURSE PRACTITIONER

## 2024-12-18 PROCEDURE — 99204 OFFICE O/P NEW MOD 45 MIN: CPT | Mod: S$GLB,,, | Performed by: NURSE PRACTITIONER

## 2024-12-18 PROCEDURE — 82043 UR ALBUMIN QUANTITATIVE: CPT | Performed by: NURSE PRACTITIONER

## 2024-12-18 PROCEDURE — 1159F MED LIST DOCD IN RCRD: CPT | Mod: CPTII,S$GLB,, | Performed by: NURSE PRACTITIONER

## 2024-12-18 RX ORDER — AMLODIPINE BESYLATE 10 MG/1
10 TABLET ORAL DAILY
Qty: 30 TABLET | Refills: 0 | Status: SHIPPED | OUTPATIENT
Start: 2024-12-18

## 2024-12-18 RX ORDER — OLMESARTAN MEDOXOMIL 40 MG/1
40 TABLET ORAL DAILY
Qty: 30 TABLET | Refills: 0 | Status: SHIPPED | OUTPATIENT
Start: 2024-12-18

## 2024-12-18 NOTE — PROGRESS NOTES
Subjective:      Patient ID: Lorne Hector is a 34 y.o. male.    Chief Complaint: Physical Exam and Medication Refill    History of Present Illness    CHIEF COMPLAINT:  Lorne presents today for a follow-up and medication management.    HYPERTENSION:  He has not taken prescribed blood pressure medication for approximately one year. He self-monitors blood pressure at home with recent elevated readings of 167, and at times reaching close to 200.    DIABETES RISK:  He discontinued diabetes medication in March due to prescription access difficulties.    DEPRESSION:  He reports experiencing situational depression related to relationship difficulties, specifically his girlfriend's lack of contact for three months. Depression is not typical for him, and he declines psychiatric consultation or medication management.    EXERCISE:  He performs regular full-body workouts at the gym and frequently walks in his neighborhood, though reports no weight loss despite this exercise regimen.      ROS:  General: -fever, -chills, -fatigue, -weight gain, -weight loss  Eyes: -vision changes, -redness, -discharge  ENT: -ear pain, -nasal congestion, -sore throat  Cardiovascular: -chest pain, -palpitations, -lower extremity edema  Respiratory: -cough, -shortness of breath  Gastrointestinal: -abdominal pain, -nausea, -vomiting, -diarrhea, -constipation, -blood in stool  Genitourinary: -dysuria, -hematuria, -frequency  Musculoskeletal: -joint pain, -muscle pain  Skin: -rash, -lesion  Neurological: -headache, -dizziness, -numbness, -tingling  Psychiatric: -anxiety, +depression, -sleep difficulty          Patient Active Problem List   Diagnosis    Acute medial meniscus tear of right knee    Cyst of meniscus of right knee    Iliotibial band syndrome of right side    Abdominal pain         Current Outpatient Medications:     cyclobenzaprine (FLEXERIL) 10 MG tablet, Take 10 mg by mouth 3 (three) times daily as needed for Muscle spasms.  "NOT TAKING, Disp: , Rfl:     tirzepatide (MOUNJARO) 5 mg/0.5 mL PnIj, Inject 5 mg into the skin every 7 days., Disp: , Rfl:     amLODIPine (NORVASC) 10 MG tablet, Take 1 tablet (10 mg total) by mouth once daily., Disp: 30 tablet, Rfl: 0    diclofenac (VOLTAREN) 75 MG EC tablet, Take 1 tablet (75 mg total) by mouth 2 (two) times daily with meals. (Patient not taking: Reported on 12/18/2024), Disp: 60 tablet, Rfl: 1    olmesartan (BENICAR) 40 MG tablet, Take 1 tablet (40 mg total) by mouth once daily., Disp: 30 tablet, Rfl: 0    ondansetron (ZOFRAN-ODT) 8 MG TbDL, Take 1 tablet (8 mg total) by mouth 3 (three) times daily. (Patient not taking: Reported on 12/18/2024), Disp: 15 tablet, Rfl: 0    pantoprazole (PROTONIX) 40 MG tablet, Take 1 tablet (40 mg total) by mouth once daily. (Patient not taking: Reported on 12/18/2024), Disp: 30 tablet, Rfl: 2      Objective:   BP (!) 170/100 (BP Location: Right arm, Patient Position: Sitting)   Pulse (!) 116   Temp 96.6 °F (35.9 °C) (Tympanic)   Ht 6' 3" (1.905 m)   Wt (!) 185.6 kg (409 lb 2.8 oz)   SpO2 98%   BMI 51.14 kg/m²     Physical Exam             Physical Exam  Vitals and nursing note reviewed.   Constitutional:       General: He is awake. He is not in acute distress.     Appearance: Normal appearance. He is well-developed and well-groomed. He is morbidly obese. He is not ill-appearing, toxic-appearing or diaphoretic.   HENT:      Head: Normocephalic.      Right Ear: Tympanic membrane, ear canal and external ear normal. No middle ear effusion. There is no impacted cerumen.      Left Ear: Tympanic membrane, ear canal and external ear normal.  No middle ear effusion. There is no impacted cerumen.      Nose: No congestion or rhinorrhea.      Mouth/Throat:      Pharynx: No oropharyngeal exudate or posterior oropharyngeal erythema.   Neck:      Thyroid: No thyroid mass, thyromegaly or thyroid tenderness.      Vascular: No carotid bruit.   Cardiovascular:      Rate and " Rhythm: Normal rate and regular rhythm.      Heart sounds: Normal heart sounds.   Pulmonary:      Effort: Pulmonary effort is normal. No tachypnea, bradypnea, accessory muscle usage, prolonged expiration, respiratory distress or retractions.      Breath sounds: Normal breath sounds. No stridor, decreased air movement or transmitted upper airway sounds. No decreased breath sounds, wheezing, rhonchi or rales.   Abdominal:      General: Abdomen is flat. Bowel sounds are normal. There is no distension.      Palpations: Abdomen is soft. There is no mass.      Tenderness: There is no abdominal tenderness. There is no right CVA tenderness, left CVA tenderness, guarding or rebound. Negative signs include Banegas's sign and McBurney's sign.      Hernia: No hernia is present.   Musculoskeletal:      Cervical back: Full passive range of motion without pain and normal range of motion.   Skin:     General: Skin is warm and dry.   Neurological:      General: No focal deficit present.      Mental Status: He is alert and oriented to person, place, and time.      GCS: GCS eye subscore is 4. GCS verbal subscore is 5. GCS motor subscore is 6.      Gait: Gait normal.   Psychiatric:         Attention and Perception: Attention and perception normal.         Mood and Affect: Mood and affect normal.         Speech: Speech normal.         Behavior: Behavior normal. Behavior is cooperative.         Thought Content: Thought content normal.         Cognition and Memory: Cognition normal.         Judgment: Judgment normal.          Assessment/Plan :   1. Encounter for preventative adult health care examination  -     CBC W/ AUTO DIFFERENTIAL; Future; Expected date: 12/18/2024  -     COMPREHENSIVE METABOLIC PANEL; Future; Expected date: 12/18/2024  -     HEMOGLOBIN A1C; Future; Expected date: 12/18/2024  -     TSH; Future; Expected date: 12/18/2024  -     Lipid Panel; Future; Expected date: 12/18/2024  -     Microalbumin/Creatinine Ratio, Urine;  Future; Expected date: 12/18/2024  -     SCHEDULED EKG 12-LEAD (to Muse); Future    2. Essential hypertension, benign  -     CBC W/ AUTO DIFFERENTIAL; Future; Expected date: 12/18/2024  -     COMPREHENSIVE METABOLIC PANEL; Future; Expected date: 12/18/2024  -     amLODIPine (NORVASC) 10 MG tablet; Take 1 tablet (10 mg total) by mouth once daily.  Dispense: 30 tablet; Refill: 0  -     SCHEDULED EKG 12-LEAD (to Muse); Future  -     olmesartan (BENICAR) 40 MG tablet; Take 1 tablet (40 mg total) by mouth once daily.  Dispense: 30 tablet; Refill: 0    3. Type 2 diabetes mellitus without complication, without long-term current use of insulin  -     HEMOGLOBIN A1C; Future; Expected date: 12/18/2024  -     Microalbumin/Creatinine Ratio, Urine; Future; Expected date: 12/18/2024    4. Tachycardia, unspecified    5. Screening for thyroid disorder  -     TSH; Future; Expected date: 12/18/2024    6. Encounter for screening for lipid disorder  -     Lipid Panel; Future; Expected date: 12/18/2024    7. Encounter for screening for HIV  -     HIV 1/2 Ag/Ab (4th Gen); Future; Expected date: 12/18/2024    8. Positive depression screening  Comments:  I have reviewed the positive depression score with the patient and found that no additional intervention is needed at this time.       BP Readings from Last 15 Encounters:   12/18/24 (!) 170/100   02/25/20 (!) 144/80   09/06/19 132/84   05/21/19 138/86   04/09/19 128/84   12/10/18 (!) 144/90   03/13/18 (!) 150/90   02/23/18 136/84   11/10/17 (!) 150/87   10/26/17 (!) 166/94   10/25/17 (P) 123/87   09/12/17 130/86   02/10/17 (!) 140/90   12/02/15 (!) 146/82   07/10/14 128/88         Assessment & Plan     Assessed patient's risk for diabetes and hypertension  Evaluated current medication regimen, noting non-compliance with blood pressure medications  Considered depression screening results, though patient declined further intervention  Noted BMI of 51.4, indicating need for lifestyle  modifications    HYPERTENSION:  - Restarted Benicar 40 mg daily and Amlodipine 10 mg daily.  - Sent prescriptions to [facility] pharmacy at [id number] [address] [facility] (near [place]).  - Recommend following a low-salt, low-carb diet.  - Ordered EKG to further evaluate cardiovascular status.  - Follow up in 2 weeks to reassess blood pressure control.  - Discussed importance of medication compliance for preventing stroke and heart attack.    DIABETES:  - Ordered CBC, CMP, A1C, TSH, and lipid panel for routine labs.  - Ordered urinalysis for kidney function.    OBESITY:  - Lorne to walk in neighborhood for 30 minutes a day, starting with 3 days a week (e.g., Monday, Wednesday, Friday or Tuesday, Thursday, Saturday) and increasing frequency over time.    DEPRESSION:  - Explained connection between vitamin D deficiency and depression.    GENERAL HEALTH SCREENING:  - Ordered HIV screening.    FOLLOW-UP:  - Follow up in 2 weeks for blood pressure evaluation.          Follow up if symptoms worsen or fail to improve.    This note was generated with the assistance of ambient listening technology. Verbal consent was obtained by the patient and accompanying visitor(s) for the recording of patient appointment to facilitate this note. I attest to having reviewed and edited the generated note for accuracy, though some syntax or spelling errors may persist. Please contact the author of this note for any clarification.

## 2024-12-19 LAB
ALBUMIN/CREAT UR: 610.6 UG/MG (ref 0–30)
CREAT UR-MCNC: 331 MG/DL (ref 23–375)
MICROALBUMIN UR DL<=1MG/L-MCNC: 2021 UG/ML

## 2024-12-23 ENCOUNTER — HOSPITAL ENCOUNTER (OUTPATIENT)
Dept: CARDIOLOGY | Facility: HOSPITAL | Age: 34
Discharge: HOME | End: 2024-12-23
Payer: COMMERCIAL

## 2024-12-23 DIAGNOSIS — Z00.00 ENCOUNTER FOR PREVENTATIVE ADULT HEALTH CARE EXAMINATION: ICD-10-CM

## 2024-12-23 DIAGNOSIS — I10 ESSENTIAL HYPERTENSION, BENIGN: ICD-10-CM

## 2024-12-23 LAB
OHS QRS DURATION: 94 MS
OHS QTC CALCULATION: 446 MS

## 2024-12-23 PROCEDURE — 93005 ELECTROCARDIOGRAM TRACING: CPT

## 2024-12-23 PROCEDURE — 93010 ELECTROCARDIOGRAM REPORT: CPT | Mod: ,,, | Performed by: INTERNAL MEDICINE

## 2024-12-26 ENCOUNTER — TELEPHONE (OUTPATIENT)
Dept: INTERNAL MEDICINE | Facility: CLINIC | Age: 34
End: 2024-12-26
Payer: COMMERCIAL

## 2024-12-26 ENCOUNTER — CLINICAL SUPPORT (OUTPATIENT)
Dept: INTERNAL MEDICINE | Facility: CLINIC | Age: 34
End: 2024-12-26
Payer: COMMERCIAL

## 2024-12-26 VITALS — SYSTOLIC BLOOD PRESSURE: 160 MMHG | DIASTOLIC BLOOD PRESSURE: 80 MMHG

## 2024-12-26 DIAGNOSIS — I10 ESSENTIAL HYPERTENSION, BENIGN: Primary | ICD-10-CM

## 2024-12-26 PROCEDURE — 99999 PR PBB SHADOW E&M-EST. PATIENT-LVL II: CPT | Mod: PBBFAC,,,

## 2024-12-26 RX ORDER — VALSARTAN AND HYDROCHLOROTHIAZIDE 320; 25 MG/1; MG/1
1 TABLET, FILM COATED ORAL DAILY
Qty: 30 TABLET | Refills: 0 | Status: SHIPPED | OUTPATIENT
Start: 2024-12-26

## 2024-12-26 NOTE — TELEPHONE ENCOUNTER
Patient here for BP recheck.  BP still elevated and reports this is what his BP is measuring at home.  Discontinue olmesartan.  Start taking valsartan-hydrochlorothiazide.  Record blood pressure twice daily and bring measurements to next appointment.

## 2024-12-30 NOTE — PROGRESS NOTES
"Pt came in 12/26/2024 for a nurse visit to check blood pressure, reading was 160/80 on the left arm while sitting with a thigh cuff, pt states this is what his readings at home have been. Per Ms. Peña he should come back for another nurse visit blood pressure check but pt walked out before scheduling stating that he "had to go".    "

## 2025-01-23 RX ORDER — ROSUVASTATIN CALCIUM 40 MG/1
40 TABLET, COATED ORAL NIGHTLY
Qty: 90 TABLET | Refills: 3 | Status: SHIPPED | OUTPATIENT
Start: 2025-01-23

## 2025-01-24 DIAGNOSIS — E11.9 TYPE 2 DIABETES MELLITUS WITHOUT COMPLICATION, WITHOUT LONG-TERM CURRENT USE OF INSULIN: Primary | ICD-10-CM

## 2025-01-24 DIAGNOSIS — I10 ESSENTIAL HYPERTENSION, BENIGN: ICD-10-CM

## 2025-01-24 DIAGNOSIS — E11.9 TYPE 2 DIABETES MELLITUS WITHOUT COMPLICATION, WITHOUT LONG-TERM CURRENT USE OF INSULIN: ICD-10-CM

## 2025-01-24 RX ORDER — AMLODIPINE BESYLATE 10 MG/1
10 TABLET ORAL DAILY
Qty: 7 TABLET | Refills: 0 | Status: SHIPPED | OUTPATIENT
Start: 2025-01-24 | End: 2025-01-29 | Stop reason: SDUPTHER

## 2025-01-24 RX ORDER — TIRZEPATIDE 7.5 MG/.5ML
7.5 INJECTION, SOLUTION SUBCUTANEOUS
Qty: 4 ML | Refills: 1 | Status: SHIPPED | OUTPATIENT
Start: 2025-01-24 | End: 2025-01-29 | Stop reason: DRUGHIGH

## 2025-01-24 NOTE — TELEPHONE ENCOUNTER
It has been a month since I last saw him and his BP is not controlled.  He may come to see me tomorrow or any day next week.  I am only giving him 1 week on his blood pressure medication as he needs to be seen in person to ensure his blood pressure is control.  Also ask him to bring in his BP measurements

## 2025-01-25 ENCOUNTER — TELEPHONE (OUTPATIENT)
Dept: INTERNAL MEDICINE | Facility: CLINIC | Age: 35
End: 2025-01-25
Payer: COMMERCIAL

## 2025-01-25 NOTE — TELEPHONE ENCOUNTER
Sent [employer] message that pt needs to be taking both bp medications and neds to have a bp check nurse visit.

## 2025-01-29 ENCOUNTER — OFFICE VISIT (OUTPATIENT)
Dept: INTERNAL MEDICINE | Facility: CLINIC | Age: 35
End: 2025-01-29
Payer: COMMERCIAL

## 2025-01-29 ENCOUNTER — LAB VISIT (OUTPATIENT)
Dept: LAB | Facility: HOSPITAL | Age: 35
End: 2025-01-29
Attending: FAMILY MEDICINE
Payer: COMMERCIAL

## 2025-01-29 DIAGNOSIS — E66.9 OBESITY, DIABETES, AND HYPERTENSION SYNDROME: Chronic | ICD-10-CM

## 2025-01-29 DIAGNOSIS — E66.01 MORBID OBESITY WITH BMI OF 50.0-59.9, ADULT: Chronic | ICD-10-CM

## 2025-01-29 DIAGNOSIS — E11.29 TYPE 2 DIABETES MELLITUS WITH DIABETIC MICROALBUMINURIA, WITH LONG-TERM CURRENT USE OF INSULIN: Chronic | ICD-10-CM

## 2025-01-29 DIAGNOSIS — E11.65 TYPE 2 DIABETES MELLITUS WITH HYPERGLYCEMIA, WITH LONG-TERM CURRENT USE OF INSULIN: Primary | Chronic | ICD-10-CM

## 2025-01-29 DIAGNOSIS — E11.69 OBESITY, DIABETES, AND HYPERTENSION SYNDROME: Chronic | ICD-10-CM

## 2025-01-29 DIAGNOSIS — I15.2 HYPERTENSION ASSOCIATED WITH DIABETES: Chronic | ICD-10-CM

## 2025-01-29 DIAGNOSIS — Z79.4 TYPE 2 DIABETES MELLITUS WITH HYPERGLYCEMIA, WITH LONG-TERM CURRENT USE OF INSULIN: Primary | Chronic | ICD-10-CM

## 2025-01-29 DIAGNOSIS — R80.9 DIABETES MELLITUS WITH PROTEINURIA: Chronic | ICD-10-CM

## 2025-01-29 DIAGNOSIS — I1A.0 RESISTANT HYPERTENSION: Chronic | ICD-10-CM

## 2025-01-29 DIAGNOSIS — E11.59 OBESITY, DIABETES, AND HYPERTENSION SYNDROME: Chronic | ICD-10-CM

## 2025-01-29 DIAGNOSIS — R80.9 TYPE 2 DIABETES MELLITUS WITH DIABETIC MICROALBUMINURIA, WITH LONG-TERM CURRENT USE OF INSULIN: Chronic | ICD-10-CM

## 2025-01-29 DIAGNOSIS — E11.59 HYPERTENSION ASSOCIATED WITH DIABETES: Chronic | ICD-10-CM

## 2025-01-29 DIAGNOSIS — E11.29 DIABETES MELLITUS WITH PROTEINURIA: Chronic | ICD-10-CM

## 2025-01-29 DIAGNOSIS — E78.5 HYPERLIPIDEMIA ASSOCIATED WITH TYPE 2 DIABETES MELLITUS: Chronic | ICD-10-CM

## 2025-01-29 DIAGNOSIS — I15.2 OBESITY, DIABETES, AND HYPERTENSION SYNDROME: Chronic | ICD-10-CM

## 2025-01-29 DIAGNOSIS — E11.69 HYPERLIPIDEMIA ASSOCIATED WITH TYPE 2 DIABETES MELLITUS: Chronic | ICD-10-CM

## 2025-01-29 DIAGNOSIS — Z79.4 TYPE 2 DIABETES MELLITUS WITH DIABETIC MICROALBUMINURIA, WITH LONG-TERM CURRENT USE OF INSULIN: Chronic | ICD-10-CM

## 2025-01-29 PROBLEM — M23.006 CYST OF MENISCUS OF RIGHT KNEE: Status: RESOLVED | Noted: 2017-11-10 | Resolved: 2025-01-29

## 2025-01-29 PROBLEM — S83.241A ACUTE MEDIAL MENISCUS TEAR OF RIGHT KNEE: Status: RESOLVED | Noted: 2017-11-10 | Resolved: 2025-01-29

## 2025-01-29 PROBLEM — M76.31 ILIOTIBIAL BAND SYNDROME OF RIGHT SIDE: Status: RESOLVED | Noted: 2017-11-10 | Resolved: 2025-01-29

## 2025-01-29 PROBLEM — R10.9 ABDOMINAL PAIN: Status: RESOLVED | Noted: 2020-02-25 | Resolved: 2025-01-29

## 2025-01-29 PROCEDURE — 36415 COLL VENOUS BLD VENIPUNCTURE: CPT | Performed by: FAMILY MEDICINE

## 2025-01-29 PROCEDURE — 80048 BASIC METABOLIC PNL TOTAL CA: CPT | Performed by: FAMILY MEDICINE

## 2025-01-29 RX ORDER — LANCETS
EACH MISCELLANEOUS
Qty: 200 EACH | Refills: 11 | Status: SHIPPED | OUTPATIENT
Start: 2025-01-29

## 2025-01-29 RX ORDER — INSULIN PUMP SYRINGE, 3 ML
EACH MISCELLANEOUS
Qty: 1 EACH | Refills: 0 | Status: SHIPPED | OUTPATIENT
Start: 2025-01-29

## 2025-01-29 RX ORDER — AMLODIPINE BESYLATE 10 MG/1
10 TABLET ORAL DAILY
Qty: 30 TABLET | Refills: 1 | Status: SHIPPED | OUTPATIENT
Start: 2025-01-29

## 2025-01-29 RX ORDER — INSULIN GLARGINE 100 [IU]/ML
20 INJECTION, SOLUTION SUBCUTANEOUS DAILY
Qty: 6 ML | Refills: 2 | Status: SHIPPED | OUTPATIENT
Start: 2025-01-29

## 2025-01-29 RX ORDER — ISOPROPYL ALCOHOL 70 ML/100ML
SWAB TOPICAL
Qty: 200 EACH | Refills: 11 | Status: SHIPPED | OUTPATIENT
Start: 2025-01-29

## 2025-01-29 RX ORDER — PEN NEEDLE, DIABETIC 30 GX3/16"
NEEDLE, DISPOSABLE MISCELLANEOUS
Qty: 200 EACH | Refills: 5 | Status: SHIPPED | OUTPATIENT
Start: 2025-01-29

## 2025-01-29 RX ORDER — LANCING DEVICE
EACH MISCELLANEOUS
Qty: 1 EACH | Refills: 3 | Status: SHIPPED | OUTPATIENT
Start: 2025-01-29

## 2025-01-29 RX ORDER — VALSARTAN AND HYDROCHLOROTHIAZIDE 320; 25 MG/1; MG/1
1 TABLET, FILM COATED ORAL DAILY
Qty: 30 TABLET | Refills: 1 | Status: SHIPPED | OUTPATIENT
Start: 2025-01-29

## 2025-01-29 NOTE — Clinical Note
Schedule labs and US soon (within 2 weeks.) Schedule OV with Bella a couple days after tests completed.

## 2025-01-29 NOTE — PROGRESS NOTES
TELEMEDICINE VIRTUAL VIDEO VISIT  1/29/25 10:40 AM CST    Visit Type: Audiovisual    Patient's Location: Lorne represents that they are located within the [facility].    History of Present Illness    CHIEF COMPLAINT:  Lorne presents today for follow up of chronic health conditions.    DIABETES:  He reports irregular blood sugar monitoring at home. He denies having received any diabetes education in the past and has no prior experience with insulin therapy. Hemoglobin A1C was 13.3%.    WEIGHT MANAGEMENT:  He reports previous experience with Trulicity, during which he lost approximately 30 lbs over three months before discontinuing due to supply chain shortage. Currently on Trizepatide (Mounjaro) 10mg with no significant weight loss.    HYPERTENSION:  Recent home blood pressure reading was 160/90. He is currently out of amlodipine. He has been prescribed valsartan and hydrochlorothiazide for blood pressure control.    MEDICATIONS:  He takes rosuvastatin in the evenings for cholesterol management.       Review of Systems   Respiratory:  Negative for chest tightness and shortness of breath.    Cardiovascular:  Negative for chest pain.   Endocrine: Negative for polydipsia and polyuria.       Assessment & Plan    E11.9 Type 2 diabetes mellitus without complications  E78.5 Hyperlipidemia, unspecified  E66.01 Morbid (severe) obesity due to excess calories  I10 Essential (primary) hypertension  Z79.4 Long term (current) use of insulin     Patient's diabetes is severely uncontrolled with HbA1c of 13.3%   Failed to respond to current Trulicity (tirzepatide) regimen; weight unchanged   Blood pressure remains elevated despite current antihypertensive regimen   Suspect possible metabolic condition contributing to difficult-to-control hypertension   Deferred adjusting antihypertensives pending lab results and accurate in-office BP measurement   Initiated insulin therapy due to severely elevated glucose   Consider  bariatric surgery referral once diabetes is better controlled to reduce surgical risks    PATIENT EDUCATION:   Educated on the meaning of HbA1c and its relation to average glucose levels   Explained rationale for insulin therapy and its role in managing severely elevated glucose    ACTION ITEMS/LIFESTYLE:   Lorne to check blood sugar at least 1 time daily, preferably before evening insulin injection   Lorne to record blood sugar readings and insulin doses using a smartphone jyotsna or paper log   Lorne to monitor and record blood pressure readings regularly   Lorne to obtain a pill organizer for medication management   Lorne to bring prescription bottles to next appointment    MEDICATIONS:   Started Lantus insulin pen for once-daily injection, preferably in the evening   Continued Trulicity (tirzepatide) 10 mg; increase to 12.5 mg after completing current 4-week supply   Continued rosuvastatin for cholesterol management, to be taken in the evening   Continued valsartan/hydrochlorothiazide and amlodipine for blood pressure management    ORDERS:   Ultrasound of kidneys ordered   Additional blood and urine tests ordered to investigate causes of difficult-to-control hypertension   Refills for glucose testing supplies compatible with patient's current meter ordered    REFERRALS:   Referred to diabetes education program   Referred to diabetes clinic specialist for comprehensive diabetes management    FOLLOW UP:   Follow up next week to review test results   Contact the office regarding scheduling of labs and kidney ultrasound   Follow up with either Dr. Rosales or Linda Peña a few days after tests are completed   Follow up in 1-2 months until diabetes is well-controlled   Bring blood pressure monitor and glucose meter to next appointment for accuracy check       1. Type 2 diabetes mellitus with hyperglycemia, with long-term current use of insulin  -     Ambulatory referral/consult to Diabetic Advanced  "Practice Providers (Medical Management); Future; Expected date: 02/05/2025  -     Ambulatory referral/consult to Diabetes Education; Future; Expected date: 02/05/2025  -     insulin glargine U-100, Lantus, (LANTUS SOLOSTAR U-100 INSULIN) 100 unit/mL (3 mL) InPn pen; Inject 20 Units into the skin once daily.  Dispense: 6 mL; Refill: 2  -     blood-glucose meter kit; Use as instructed  Dispense: 1 each; Refill: 0  -     blood sugar diagnostic (BLOOD GLUCOSE TEST) Strp; Check blood glucose 1-2 times daily and as needed for symptoms of low or high blood glucose.  Dispense: 200 strip; Refill: 11  -     lancets (LANCETS,THIN) Misc; Check blood glucose 1-2 times daily and as needed for symptoms of low or high blood glucose.  Dispense: 200 each; Refill: 11  -     lancing device Misc; Use as directed.  Dispense: 1 each; Refill: 3  -     alcohol swabs (ALCOHOL PREP PADS) PadM; Use to check blood glucose as directed.  Dispense: 200 each; Refill: 11  -     tirzepatide 12.5 mg/0.5 mL PnIj; Inject 12.5 mg into the skin every 7 days.  Dispense: 2 mL; Refill: 2  -     pen needle, diabetic (PEN NEEDLE) 31 gauge x 3/16" Ndle; Use as directed to inject insulin as prescribed by [facility] provider  Dispense: 200 each; Refill: 5    2. Diabetes mellitus with proteinuria  -     US Kidney; Future; Expected date: 01/29/2025  -     Protein/Creatinine Ratio, Urine  -     US Renal Artery Stenosis Hyperten (xpd); Future; Expected date: 01/29/2025    3. Type 2 diabetes mellitus with diabetic microalbuminuria, with long-term current use of insulin  -     Protein/Creatinine Ratio, Urine    4. Hypertension associated with diabetes  -     Basic Metabolic Panel; Future; Expected date: 01/29/2025  -     amLODIPine (NORVASC) 10 MG tablet; Take 1 tablet (10 mg total) by mouth once daily.  Dispense: 30 tablet; Refill: 1  -     valsartan-hydrochlorothiazide (DIOVAN-HCT) 320-25 mg per tablet; Take 1 tablet by mouth once daily.  Dispense: 30 tablet; Refill: " "1    5. Hyperlipidemia associated with type 2 diabetes mellitus    6. Resistant hypertension  -     US Kidney; Future; Expected date: 01/29/2025  -     Protein/Creatinine Ratio, Urine  -     Aldosterone/Renin Activity Ratio; Future; Expected date: 01/29/2025  -     METANEPHRINES, PLASMA FREE; Future; Expected date: 01/29/2025  -     US Renal Artery Stenosis Hyperten (xpd); Future; Expected date: 01/29/2025  -     Basic Metabolic Panel; Future; Expected date: 01/29/2025  -     amLODIPine (NORVASC) 10 MG tablet; Take 1 tablet (10 mg total) by mouth once daily.  Dispense: 30 tablet; Refill: 1  -     valsartan-hydrochlorothiazide (DIOVAN-HCT) 320-25 mg per tablet; Take 1 tablet by mouth once daily.  Dispense: 30 tablet; Refill: 1    7. Morbid obesity with BMI of 50.0-59.9, adult  Assessment & Plan:  Wt Readings from Last 6 Encounters:   12/18/24 (!) 185.6 kg (409 lb 2.8 oz)   02/25/20 (!) 185.7 kg (409 lb 6.3 oz)   09/06/19 (!) 182.5 kg (402 lb 5.4 oz)   05/21/19 (!) 184.4 kg (406 lb 8.5 oz)   04/09/19 (!) 183.5 kg (404 lb 8.7 oz)   12/10/18 (!) 182.7 kg (402 lb 12.5 oz)     Estimated body mass index is 51.14 kg/m² as calculated from the following:    Height as of 12/18/24: 6' 3" (1.905 m).    Weight as of 12/18/24: 185.6 kg (409 lb 2.8 oz).        8. Obesity, diabetes, and hypertension syndrome  Overview:  No history or family history of thyroid cancer or multiple endocrine neoplasia syndrome.       No other significant complaints or concerns were reported.  Today's visit involved the intricate management of episodic problem(s) and the ongoing care for the patient's serious or complex condition(s) listed above, reflecting the inherent complexity of providing longitudinal, comprehensive evaluation and management as the central hub for the patient's primary care services.  There were no vitals filed for this visit.  PHYSICAL EXAM:  GENERAL APPEARANCE:  - Alert and grossly oriented.  - No apparent distress, breathing " comfortably.     EYES:  - Sclera without icterus.     EARS, NOSE, AND THROAT:  - No visible abnormalities.     RESPIRATORY:  - No respiratory distress.  - No audible wheezing or cough.     PSYCHIATRIC:  - Mood and affect appropriate; behavior cooperative.  This note was generated with the assistance of ambient listening technology. Verbal consent was obtained by the patient and accompanying visitor(s) for the recording of patient appointment to facilitate this note. I attest to having reviewed and edited the generated note for accuracy, though some syntax or spelling errors may persist. Please contact the author of this note for any clarification.      I spent a total of 42 minutes today evaluating and managing this patient for this encounter.  This includes face to face time and non-face to face time preparing to see the patient (eg, review of tests), obtaining and/or reviewing separately obtained history, documenting clinical information in the electronic or other health record, independently interpreting results and communicating results to the patient/family/caregiver, or care coordinator. This time was spent personally by me on the following activities: review of nurse's notes, pre-charting, review of patient's past medical history, assessing age-appropriate health maintenance needs, review of any interval history, medication reconciliation, reviewing/reconciling/updating problem list, reviewing/reconciling/updating PMFSH, obtaining history from the patient, examination of the patient, review and interpretation of lab results, answering patient's questions about lab results, review and interpretation of cardiology test results, reviewing other provider's chart notes, evaluation of the patient's response to treatment, managing and/or ordering prescription medications, explaining rationale for medication change(s) and answering patient's questions, medication counseling, discussing strategies to help overcome any  "barriers to adherence to medication regimen, ordering labs, ordering imaging tests, educating patient and answering their questions about risks and benefits of treatment options, educating patient and answering their questions about treatment plan, goals of treatment, and follow-up, counseling on appropriate therapeutic lifestyle changes, discussing strategies to help overcome any barriers to adherence to treatment plan, communicating with collaborating provider(s), and final documentation of the visit.  This time was exclusive of any separately billable procedures for this patient and exclusive of time spent treating any other patient.    Documentation entered by me for this encounter may have been done in part using speech-recognition technology. Although I have made an effort to ensure accuracy, "sound like" errors may exist and should be interpreted in context.    Each patient to whom medical services are provided by telemedicine is: (1) informed of the relationship between the physician and patient and the respective role of any other health care provider with respect to management of the patient; and (2) notified that he or she may decline to receive medical services by telemedicine and may withdraw from such care at any time.    Orders Placed This Encounter    US Kidney    US Renal Artery Stenosis Hyperten (xpd)    Protein/Creatinine Ratio, Urine    Aldosterone/Renin Activity Ratio    METANEPHRINES, PLASMA FREE    Basic Metabolic Panel    Ambulatory referral/consult to Diabetic Advanced Practice Providers (Medical Management)    Ambulatory referral/consult to Diabetes Education    insulin glargine U-100, Lantus, (LANTUS SOLOSTAR U-100 INSULIN) 100 unit/mL (3 mL) InPn pen    blood-glucose meter kit    blood sugar diagnostic (BLOOD GLUCOSE TEST) Strp    lancets (LANCETS,THIN) Misc    lancing device Misc    alcohol swabs (ALCOHOL PREP PADS) PadM    tirzepatide 12.5 mg/0.5 mL PnIj    pen needle, diabetic (PEN " "NEEDLE) 31 gauge x 3/16" Ndle    amLODIPine (NORVASC) 10 MG tablet    valsartan-hydrochlorothiazide (DIOVAN-HCT) 320-25 mg per tablet     WRAP-UP INSTRUCTIONS  Schedule labs and US soon (within 2 weeks.)  Schedule OV with Bella a couple days after tests completed.    FOR FOLLOW-UP AT NEXT APPOINTMENT  @[email or web address] [place]: Review results. Review home blood glucose and insulin records. Ensure he is scheduled for an appointment with the diabetes clinic and diabetes education. Titrate hypertension medications as needed. If aldosterone level is elevated, consider spironolactone. If not, then consider starting carvedilol 6.25 milligrams twice daily. I question the reliability of his home blood pressure monitoring. Until his blood pressure is well controlled, he needs to come in for in-office visits at least every couple of weeks for reevaluation of his blood pressure and review of his home blood glucose measurements and insulin dosing.  "

## 2025-01-29 NOTE — ASSESSMENT & PLAN NOTE
"Wt Readings from Last 6 Encounters:   12/18/24 (!) 185.6 kg (409 lb 2.8 oz)   02/25/20 (!) 185.7 kg (409 lb 6.3 oz)   09/06/19 (!) 182.5 kg (402 lb 5.4 oz)   05/21/19 (!) 184.4 kg (406 lb 8.5 oz)   04/09/19 (!) 183.5 kg (404 lb 8.7 oz)   12/10/18 (!) 182.7 kg (402 lb 12.5 oz)     Estimated body mass index is 51.14 kg/m² as calculated from the following:    Height as of 12/18/24: 6' 3" (1.905 m).    Weight as of 12/18/24: 185.6 kg (409 lb 2.8 oz).    "

## 2025-01-30 LAB
ANION GAP SERPL CALC-SCNC: 12 MMOL/L (ref 8–16)
BUN SERPL-MCNC: 8 MG/DL (ref 6–20)
CALCIUM SERPL-MCNC: 9.8 MG/DL (ref 8.7–10.5)
CHLORIDE SERPL-SCNC: 104 MMOL/L (ref 95–110)
CO2 SERPL-SCNC: 24 MMOL/L (ref 23–29)
CREAT SERPL-MCNC: 1 MG/DL (ref 0.5–1.4)
EST. GFR  (NO RACE VARIABLE): >60 ML/MIN/1.73 M^2
GLUCOSE SERPL-MCNC: 100 MG/DL (ref 70–110)
POTASSIUM SERPL-SCNC: 3.9 MMOL/L (ref 3.5–5.1)
SODIUM SERPL-SCNC: 140 MMOL/L (ref 136–145)

## 2025-02-14 ENCOUNTER — OFFICE VISIT (OUTPATIENT)
Dept: INTERNAL MEDICINE | Facility: CLINIC | Age: 35
End: 2025-02-14
Payer: COMMERCIAL

## 2025-02-14 VITALS
OXYGEN SATURATION: 97 % | BODY MASS INDEX: 39.17 KG/M2 | HEART RATE: 109 BPM | SYSTOLIC BLOOD PRESSURE: 124 MMHG | DIASTOLIC BLOOD PRESSURE: 84 MMHG | TEMPERATURE: 97 F | WEIGHT: 315 LBS | HEIGHT: 75 IN

## 2025-02-14 DIAGNOSIS — Z79.4 TYPE 2 DIABETES MELLITUS WITH DIABETIC MICROALBUMINURIA, WITH LONG-TERM CURRENT USE OF INSULIN: Chronic | ICD-10-CM

## 2025-02-14 DIAGNOSIS — E11.69 OBESITY, DIABETES, AND HYPERTENSION SYNDROME: Chronic | ICD-10-CM

## 2025-02-14 DIAGNOSIS — R80.9 TYPE 2 DIABETES MELLITUS WITH DIABETIC MICROALBUMINURIA, WITH LONG-TERM CURRENT USE OF INSULIN: Chronic | ICD-10-CM

## 2025-02-14 DIAGNOSIS — E66.01 MORBID OBESITY WITH BMI OF 50.0-59.9, ADULT: Chronic | ICD-10-CM

## 2025-02-14 DIAGNOSIS — I1A.0 RESISTANT HYPERTENSION: Chronic | ICD-10-CM

## 2025-02-14 DIAGNOSIS — I15.2 OBESITY, DIABETES, AND HYPERTENSION SYNDROME: Chronic | ICD-10-CM

## 2025-02-14 DIAGNOSIS — E11.59 HYPERTENSION ASSOCIATED WITH DIABETES: Primary | Chronic | ICD-10-CM

## 2025-02-14 DIAGNOSIS — Z23 NEED FOR 23-POLYVALENT PNEUMOCOCCAL POLYSACCHARIDE VACCINE: ICD-10-CM

## 2025-02-14 DIAGNOSIS — E66.9 OBESITY, DIABETES, AND HYPERTENSION SYNDROME: Chronic | ICD-10-CM

## 2025-02-14 DIAGNOSIS — E11.59 OBESITY, DIABETES, AND HYPERTENSION SYNDROME: Chronic | ICD-10-CM

## 2025-02-14 DIAGNOSIS — I15.2 HYPERTENSION ASSOCIATED WITH DIABETES: Primary | Chronic | ICD-10-CM

## 2025-02-14 DIAGNOSIS — E11.9 ENCOUNTER FOR DIABETIC FOOT EXAM: ICD-10-CM

## 2025-02-14 DIAGNOSIS — E11.29 TYPE 2 DIABETES MELLITUS WITH DIABETIC MICROALBUMINURIA, WITH LONG-TERM CURRENT USE OF INSULIN: Chronic | ICD-10-CM

## 2025-02-14 PROCEDURE — 1159F MED LIST DOCD IN RCRD: CPT | Mod: CPTII,S$GLB,, | Performed by: NURSE PRACTITIONER

## 2025-02-14 PROCEDURE — 99214 OFFICE O/P EST MOD 30 MIN: CPT | Mod: S$GLB,,, | Performed by: NURSE PRACTITIONER

## 2025-02-14 PROCEDURE — 3079F DIAST BP 80-89 MM HG: CPT | Mod: CPTII,S$GLB,, | Performed by: NURSE PRACTITIONER

## 2025-02-14 PROCEDURE — G2211 COMPLEX E/M VISIT ADD ON: HCPCS | Mod: S$GLB,,, | Performed by: NURSE PRACTITIONER

## 2025-02-14 PROCEDURE — 99999 PR PBB SHADOW E&M-EST. PATIENT-LVL V: CPT | Mod: PBBFAC,,, | Performed by: NURSE PRACTITIONER

## 2025-02-14 PROCEDURE — 1160F RVW MEDS BY RX/DR IN RCRD: CPT | Mod: CPTII,S$GLB,, | Performed by: NURSE PRACTITIONER

## 2025-02-14 PROCEDURE — 3008F BODY MASS INDEX DOCD: CPT | Mod: CPTII,S$GLB,, | Performed by: NURSE PRACTITIONER

## 2025-02-14 PROCEDURE — 3074F SYST BP LT 130 MM HG: CPT | Mod: CPTII,S$GLB,, | Performed by: NURSE PRACTITIONER

## 2025-02-14 RX ORDER — AMLODIPINE BESYLATE 10 MG/1
10 TABLET ORAL DAILY
Qty: 30 TABLET | Refills: 1 | Status: SHIPPED | OUTPATIENT
Start: 2025-02-14

## 2025-02-14 RX ORDER — VALSARTAN AND HYDROCHLOROTHIAZIDE 320; 25 MG/1; MG/1
1 TABLET, FILM COATED ORAL DAILY
Qty: 30 TABLET | Refills: 1 | Status: SHIPPED | OUTPATIENT
Start: 2025-02-14

## 2025-02-27 NOTE — PROGRESS NOTES
"  Subjective:      Patient ID: Lorne Hector is a 34 y.o. male.    Chief Complaint: Follow-up    History of Present Illness    CHIEF COMPLAINT:  Lorne presents today for follow-up.    DIABETES:  He checks blood sugar every morning, with recent readings around 105-101 mg/dL, though overall average is significantly elevated at 335 mg/dL. He continues Lantus for management. A1C test is scheduled for next month. He reports having dry, rough feet.    HYPERTENSION:  Blood pressure was elevated at 170/100 two weeks ago. He continues amlodipine, valsartan, and rosuvastatin.    WEIGHT MANAGEMENT:  He reports recent weight gain from 409 to 414 lbs. He has resumed gym attendance, performing 20 minutes of cardio followed by weight lifting. He reports dietary compliance without weight loss, though notes improvement with resolution of sugar cravings.    MEDICATIONS:  He reports needing refill of Majorna 10 mg as last dose was taken today. He was prescribed an increased dose of 12.5 mg but has not yet filled the prescription.    PREVENTIVE CARE:  He is due for diabetic eye exam and has not received flu vaccine this season.      ROS:  General: -fever, -chills, -fatigue, +weight gain, -weight loss  Eyes: -vision changes, -redness, -discharge  ENT: -ear pain, -nasal congestion, -sore throat  Cardiovascular: -chest pain, -palpitations, -lower extremity edema  Respiratory: -cough, -shortness of breath  Gastrointestinal: -abdominal pain, -nausea, -vomiting, -diarrhea, -constipation, -blood in stool  Genitourinary: -dysuria, -hematuria, -frequency  Musculoskeletal: -joint pain, -muscle pain  Skin: -rash, -lesion, +dry skin  Neurological: -headache, -dizziness, -numbness, -tingling  Psychiatric: -anxiety, -depression, -sleep difficulty          Problem List[1]    Current Medications[2]      Objective:   /84 (BP Location: Right arm, Patient Position: Sitting)   Pulse 109   Temp 96.9 °F (36.1 °C) (Tympanic)   Ht 6' 3" " (1.905 m)   Wt (!) 188.2 kg (414 lb 14.5 oz)   SpO2 97%   BMI 51.86 kg/m²     Physical Exam             Physical Exam  Vitals and nursing note reviewed.   Constitutional:       General: He is awake. He is not in acute distress.     Appearance: Normal appearance. He is well-developed, well-groomed and normal weight. He is not ill-appearing, toxic-appearing or diaphoretic.   HENT:      Head: Normocephalic.      Right Ear: Tympanic membrane, ear canal and external ear normal. No middle ear effusion. There is no impacted cerumen.      Left Ear: Tympanic membrane, ear canal and external ear normal.  No middle ear effusion. There is no impacted cerumen.      Nose: No congestion or rhinorrhea.      Mouth/Throat:      Pharynx: No oropharyngeal exudate or posterior oropharyngeal erythema.   Neck:      Thyroid: No thyroid mass, thyromegaly or thyroid tenderness.      Vascular: No carotid bruit.   Cardiovascular:      Rate and Rhythm: Normal rate and regular rhythm.      Pulses:           Dorsalis pedis pulses are 2+ on the right side and 2+ on the left side.        Posterior tibial pulses are 2+ on the right side and 2+ on the left side.      Heart sounds: Normal heart sounds. No murmur heard.  Pulmonary:      Effort: Pulmonary effort is normal. No tachypnea, bradypnea, accessory muscle usage, prolonged expiration, respiratory distress or retractions.      Breath sounds: Normal breath sounds. No stridor, decreased air movement or transmitted upper airway sounds. No decreased breath sounds, wheezing, rhonchi or rales.   Abdominal:      General: Abdomen is flat. Bowel sounds are normal. There is distension.      Palpations: Abdomen is soft. There is no shifting dullness, fluid wave, hepatomegaly, splenomegaly or mass.      Tenderness: There is no abdominal tenderness. There is no right CVA tenderness, left CVA tenderness, guarding or rebound. Negative signs include Banegas's sign, Rovsing's sign, McBurney's sign, psoas sign  and obturator sign.      Hernia: No hernia is present.   Musculoskeletal:      Cervical back: Full passive range of motion without pain and normal range of motion.      Right lower leg: No edema.      Left lower leg: No edema.   Feet:      Right foot:      Protective Sensation: 10 sites tested.  10 sites sensed.      Left foot:      Protective Sensation: 10 sites tested.  10 sites sensed.   Skin:     General: Skin is warm and dry.   Neurological:      General: No focal deficit present.      Mental Status: He is alert and oriented to person, place, and time.      GCS: GCS eye subscore is 4. GCS verbal subscore is 5. GCS motor subscore is 6.      Cranial Nerves: Cranial nerves 2-12 are intact.      Motor: Motor function is intact.      Gait: Gait normal.   Psychiatric:         Attention and Perception: Attention and perception normal.         Mood and Affect: Mood and affect normal.         Speech: Speech normal.         Behavior: Behavior normal. Behavior is cooperative.         Thought Content: Thought content normal.         Cognition and Memory: Cognition normal.         Judgment: Judgment normal.          Assessment/Plan :   1. Hypertension associated with diabetes  -     amLODIPine (NORVASC) 10 MG tablet; Take 1 tablet (10 mg total) by mouth once daily.  Dispense: 30 tablet; Refill: 1  -     valsartan-hydrochlorothiazide (DIOVAN-HCT) 320-25 mg per tablet; Take 1 tablet by mouth once daily.  Dispense: 30 tablet; Refill: 1    2. Resistant hypertension  -     amLODIPine (NORVASC) 10 MG tablet; Take 1 tablet (10 mg total) by mouth once daily.  Dispense: 30 tablet; Refill: 1  -     valsartan-hydrochlorothiazide (DIOVAN-HCT) 320-25 mg per tablet; Take 1 tablet by mouth once daily.  Dispense: 30 tablet; Refill: 1    3. Type 2 diabetes mellitus with diabetic microalbuminuria, with long-term current use of insulin  -     Ambulatory referral/consult to Ophthalmology; Future; Expected date: 02/21/2025  -     Foot Exam  Performed  -     pneumoc 20-joselin conj-dip cr(PF) (PREVNAR-20 (PF)) injection Syrg 0.5 mL  -     Ambulatory referral/consult to [facility]; Future; Expected date: 02/21/2025    4. Encounter for diabetic foot exam  -     Foot Exam Performed    5. Morbid obesity with BMI of 50.0-59.9, adult    6. Obesity, diabetes, and hypertension syndrome  Overview:  No history or family history of thyroid cancer or multiple endocrine neoplasia syndrome.     Orders:  -     amLODIPine (NORVASC) 10 MG tablet; Take 1 tablet (10 mg total) by mouth once daily.  Dispense: 30 tablet; Refill: 1  -     valsartan-hydrochlorothiazide (DIOVAN-HCT) 320-25 mg per tablet; Take 1 tablet by mouth once daily.  Dispense: 30 tablet; Refill: 1    7. Need for 23-polyvalent pneumococcal polysaccharide vaccine  -     pneumoc 20-joselin conj-dip cr(PF) (PREVNAR-20 (PF)) injection Syrg 0.5 mL         Assessment & Plan    Patient's blood pressure significantly improved from previous reading of 170/100  A1C remains elevated, averaging 335 over the past 2 years, indicating poor glycemic control  Noted elevated microalbumin, indicating increased risk for diabetic complications  Considered increasing Majorna dosage from 10mg to 12.5mg for improved glycemic control  Recognized patient's reduced sugar cravings as positive progress    DIABETES:  - Monitored the patient's blood sugar, which are averaging 335 over the last two years, considered very high.  - Evaluated the patient's current morning blood sugar readings of 101-105, which are within acceptable range.  - Assessed the patient's elevated A1C, indicating poor blood sugar control over time.  - Explained the risks associated with sustained elevated glucose, including diabetic coma, kidney damage, peripheral neuropathy, and heart disease.  - Clarified that A1C reflects average glucose over 3 months, not just morning readings.  - Continued Lantus at current dose.  - Ordered A1C test for next month.  -  Scheduled follow up in 3 months for diabetes management.  - Instructed the patient to contact the office if feeling the need to increase Majorna dosage before next appointment.  - Informed the patient that the clinic will send a message regarding the upcoming A1C test.  - Increased Majorna from 10 mg to 12.5 mg daily for 3 months.    HYPERTENSION:  - Monitored the patient's blood pressure regularly, with previous reading at 170/100.  - Evaluated current blood pressure reading, described as 'great' and the best ever for this patient.  - Assessed improvement in blood pressure control.  - Continued amlodipine at current dose.  - Continued valsartan at current dose.    WEIGHT MANAGEMENT:  - Lorne to increase cardio exercise at the gym for weight loss.  - Recommend increasing water intake to half body weight in ounces daily.  - Lorne to continue healthy eating habits.  - Referred to Charlene, lifestyle and wellness nurse practitioner, for body mass analysis and weight loss strategies.    LIFESTYLE CHANGES:  - Recommend lifestyle changes, including diet and exercise.  - Suggested a referral to a lifestyle and wellness nurse practitioner.    FOLLOW UP:  - Scheduled follow up in 3 months for diabetes management.          Follow up if symptoms worsen or fail to improve.    This note was generated with the assistance of ambient listening technology. Verbal consent was obtained by the patient and accompanying visitor(s) for the recording of patient appointment to facilitate this note. I attest to having reviewed and edited the generated note for accuracy, though some syntax or spelling errors may persist. Please contact the author of this note for any clarification.            [1]   Patient Active Problem List  Diagnosis    Resistant hypertension    Hyperlipidemia associated with type 2 diabetes mellitus    Hypertension associated with diabetes    Type 2 diabetes mellitus with diabetic microalbuminuria, with long-term current  "use of insulin    Diabetes mellitus with proteinuria    Type 2 diabetes mellitus with hyperglycemia, with long-term current use of insulin    Morbid obesity with BMI of 50.0-59.9, adult    Obesity, diabetes, and hypertension syndrome   [2]   Current Outpatient Medications:     alcohol swabs (ALCOHOL PREP PADS) PadM, Use to check blood glucose as directed., Disp: 200 each, Rfl: 11    blood sugar diagnostic (BLOOD GLUCOSE TEST) Strp, Check blood glucose 1-2 times daily and as needed for symptoms of low or high blood glucose., Disp: 200 strip, Rfl: 11    blood-glucose meter kit, Use as instructed, Disp: 1 each, Rfl: 0    cyclobenzaprine (FLEXERIL) 10 MG tablet, Take 10 mg by mouth 3 (three) times daily as needed for Muscle spasms. NOT TAKING, Disp: , Rfl:     insulin glargine U-100, Lantus, (LANTUS SOLOSTAR U-100 INSULIN) 100 unit/mL (3 mL) InPn pen, Inject 20 Units into the skin once daily., Disp: 6 mL, Rfl: 2    lancets (LANCETS,THIN) Misc, Check blood glucose 1-2 times daily and as needed for symptoms of low or high blood glucose., Disp: 200 each, Rfl: 11    lancing device Misc, Use as directed., Disp: 1 each, Rfl: 3    pen needle, diabetic (PEN NEEDLE) 31 gauge x 3/16" Ndle, Use as directed to inject insulin as prescribed by [facility] provider, Disp: 200 each, Rfl: 5    rosuvastatin (CRESTOR) 40 MG Tab, Take 1 tablet (40 mg total) by mouth every evening., Disp: 90 tablet, Rfl: 3    [START ON 3/17/2025] tirzepatide 12.5 mg/0.5 mL PnIj, Inject 12.5 mg into the skin every 7 days., Disp: 2 mL, Rfl: 2    amLODIPine (NORVASC) 10 MG tablet, Take 1 tablet (10 mg total) by mouth once daily., Disp: 30 tablet, Rfl: 1    valsartan-hydrochlorothiazide (DIOVAN-HCT) 320-25 mg per tablet, Take 1 tablet by mouth once daily., Disp: 30 tablet, Rfl: 1    Current Facility-Administered Medications:     pneumoc 20-joselin conj-dip cr(PF) (PREVNAR-20 (PF)) injection Syrg 0.5 mL, 0.5 mL, Intramuscular, vaccine x 1 dose,     "

## 2025-03-10 ENCOUNTER — TELEPHONE (OUTPATIENT)
Dept: DIABETES | Facility: CLINIC | Age: 35
End: 2025-03-10
Payer: COMMERCIAL

## 2025-03-10 NOTE — TELEPHONE ENCOUNTER
I called  to assist him with scheduling a appointment with [facility].  requested a appointment at [facility] he stated that clinic would be closer to him. I offered  the next available appointment on 3/24/25 @2pm with Cindi Bravo NP. The patient agreed to this date and time.

## 2025-03-18 ENCOUNTER — PATIENT OUTREACH (OUTPATIENT)
Dept: ADMINISTRATIVE | Facility: HOSPITAL | Age: 35
End: 2025-03-18
Payer: COMMERCIAL

## 2025-05-07 ENCOUNTER — TELEPHONE (OUTPATIENT)
Dept: INTERNAL MEDICINE | Facility: CLINIC | Age: 35
End: 2025-05-07
Payer: COMMERCIAL

## 2025-05-07 NOTE — TELEPHONE ENCOUNTER
Attempted to reach pt, unable to LVM, sent my chart message that pt appointment is canceled due to provider not being in clinic.

## 2025-05-29 ENCOUNTER — PATIENT OUTREACH (OUTPATIENT)
Dept: ADMINISTRATIVE | Facility: HOSPITAL | Age: 35
End: 2025-05-29
Payer: COMMERCIAL

## 2025-05-29 NOTE — PROGRESS NOTES
Working Eye exam Report: Chart searched, Pt recently canceled an appointment at [facility] (5/2/2025). Will request DM EYE Exam from last Provider.     YENY DM EYE EXAM faxed to DR. SHANKS 1x to request. Reminder set.

## 2025-05-29 NOTE — LETTER
[id number]    AUTHORIZATION FOR RELEASE OF   CONFIDENTIAL INFORMATION    Dear Dr. Mederos,    We are seeing Lorne Hector, date of birth 1990, in the clinic at [facility]. MARTHA Rosales MD is the patient's PCP. Lorne Hector has an outstanding lab/procedure at the time we reviewed his chart. In order to help keep his health information updated, he has authorized us to request the following medical record(s):        (  )  MAMMOGRAM                                      (  )  COLONOSCOPY      (  )  PAP SMEAR                                          (  )  OUTSIDE LAB RESULTS     (  )  DEXA SCAN                                          ( X ) DM EYE EXAM            (  )  FOOT EXAM                                          (  )  ENTIRE RECORD     (  )  OUTSIDE IMMUNIZATIONS                 (  )  _______________          Please fax/Email records to:  Fax: [phone]  Email: [email or web address]    [facility]  Care Coordination Department  [facility]  Phone: [phone]         Patient Name: Lorne Hetcor  : 1990  Patient Phone #: [phone]

## 2025-06-16 ENCOUNTER — PATIENT MESSAGE (OUTPATIENT)
Dept: ADMINISTRATIVE | Facility: HOSPITAL | Age: 35
End: 2025-06-16
Payer: COMMERCIAL

## 2025-07-01 ENCOUNTER — TELEPHONE (OUTPATIENT)
Dept: DIABETES | Facility: CLINIC | Age: 35
End: 2025-07-01
Payer: COMMERCIAL

## 2025-07-01 NOTE — TELEPHONE ENCOUNTER
Attempt to call patient 4 times patient is not answering the phone documenting and working the work que canceling his request

## 2025-08-19 ENCOUNTER — PATIENT MESSAGE (OUTPATIENT)
Dept: ADMINISTRATIVE | Facility: HOSPITAL | Age: 35
End: 2025-08-19
Payer: COMMERCIAL